# Patient Record
Sex: MALE | Race: BLACK OR AFRICAN AMERICAN | Employment: FULL TIME | URBAN - METROPOLITAN AREA
[De-identification: names, ages, dates, MRNs, and addresses within clinical notes are randomized per-mention and may not be internally consistent; named-entity substitution may affect disease eponyms.]

---

## 2022-11-04 ENCOUNTER — HOSPITAL ENCOUNTER (OUTPATIENT)
Dept: PHYSICAL THERAPY | Age: 27
Discharge: HOME | End: 2022-11-04
Payer: MEDICAID

## 2022-11-04 PROCEDURE — 97162 PT EVAL MOD COMPLEX 30 MIN: CPT

## 2022-11-04 NOTE — PROGRESS NOTES
[address] PHYSICAL THERAPY AT [facility]  [address] [facility] [address] [address],[address], Mauricio chavis, [address] [address]  Phone [phone]  Fax [phone] / [address] [address] PHYSICAL THERAPY SERVICES  Patient Name: Emilie Teague : 1995   Medical   Diagnosis: Pain in left shoulder [M25.512]  Right shoulder pain [M25.511] Treatment Diagnosis: B/L shoulder pain    Onset Date:      Referral Source: Davikarina Laureano MD Start of Care [facility]): 2022   Prior Hospitalization: See medical history Provider #: [id number]   Prior Level of Function: IND, currently a student    Comorbidities: Alcohol use    Medications: Verified on Patient Summary List   The Plan of Care and following information is based on the information from the initial evaluation.   =================================================================================  Assessment / key information:    Subjective functional status/changes: The patient is RHD and presents with c/o B/L shoulder pain that started 7 years ago. He states both shoulders dislocate often L>R. He states dislocations started when he was wrestling, the right shoulder dislocated when he lifted his arm up to pat his dog. No imaging performed. He has used head and ice for pain management. Functional limitations: shoulder fatigue with prolonged sitting, pain with OH activities, lifting tolerance, difficulty and pain with washing hair, apprehension with reaching behind his back, difficulty with donning/doffing jacket and sweatshirts, constant tingling to left hand, occasional off-balance dizziness, pulling, pushing, throwing, difficulty with holding leash for 60 pound dog, pain at worst 10/10        FOTO: 55 /100     Patient goal: return to exercising     Other Objective/Functional Measures:        Fall Risk Assessment: Does the patient have a fear of falling? NO  If yes, what fall risk assessment was performed?       C/S AROM:   Flexion: WNL  Extension: WNL   Side bending: RIGHT 35 deg, LEFT 30 deg   Rotation: WNL      Shoulder AROM:   Flexion: ~130 deg B/L with pain at end range and upper trap compensation on the left   Abduction: ~100 deg B/L with upper trap compensation on the left       Palpation: TTP left proximal biceps, B/L upper trap, B/L rhomboids      Posture: rounded shoulders, FHP       Pain Level (0-10 scale) post treatment: 1-2/10 RIGHT, 3/10 LEFT      ASSESSMENT/Changes in Function:   The patient presents for evaluation to address B/L shoulder pain L>R. He presents with limited B/L shoulder ROM with noted upper trap compensation on the left. Did not perform strength testing secondary to reports of apprehension from patient. Noted tenderness along left proximal biceps, B/L upper trap, B/L rhomboids. Developed and reviewed HEP.  The patient will benefit from skilled PT to address above limitations and improve QoL.   =================================================================================  Eval Complexity: History: MEDIUM  Complexity : 1-2 comorbidities / personal factors will impact the outcome/ POC Exam:MEDIUM Complexity : 3 Standardized tests and measures addressing body structure, function, activity limitation and / or participation in recreation  Presentation: MEDIUM Complexity : Evolving with changing characteristics  Clinical Decision Making:MEDIUM Complexity : FOTO score of 26-74Overall Complexity:MEDIUM  Problem List: pain affecting function, decrease ROM, decrease strength, impaired gait/ balance, decrease ADL/ functional abilitiies, decrease activity tolerance, and decrease flexibility/ joint mobility   Treatment Plan may include any combination of the following: Therapeutic exercise, Neuromuscular reeducation, Manual therapy, Therapeutic activity, Self care/home management, Electric stim unattended , Vasopneumatic device, Gait training, Ultrasound, Mechanical traction, and Electric stim attended  Patient / Family readiness to learn indicated by: asking questions  Persons(s) to be included in education: patient (P)  Barriers to Learning/Limitations: None  Measures taken:    Patient Goal (s): Strengthen shoulders and gain mobility    Patient self reported health status: fair  Rehabilitation Potential: good    Short term goals to be accomplished in 4 visits: The pt will be IND and compliant with HEP and self management of symptoms. Long term goals to be accomplished in 10 visits: The patient will improve B/L shoulder flexion AROM to 150 deg and abduction to 130 deg to improve his OH activity tolerance. The patient will improve B/L C/S side bending AROM 45 deg to improve his ADL tolerance. The patient will report pain at worst to be <6/10 as an indicator of improved QoL. The patient will improve FOTO score to 70/100 as a functional indicator of improved mobility. Frequency / Duration:   Patient to be seen  2  times per week for 10  treatments: (All LTG as above will be assessed and updated every 10 visits or 30 days and progressed as needed)    Patient / Caregiver education and instruction: exercises  Therapist Signature: Devin Rivera PT Date: 93/5/1879   Certification Period: N/A Time: 4:37 PM   ===========================================================================================  I certify that the above Physical Therapy Services are being furnished while the patient is under my care. I agree with the treatment plan and certify that this therapy is necessary. Physician Signature:        Date:       Time:     Please sign and return to [facility] or you may fax the signed copy to [phone]. Thank you.   Adalid Valentin MD

## 2022-11-04 NOTE — PROGRESS NOTES
PT DAILY TREATMENT NOTE     Patient Name: Rad Thompson  Date:2022  : 1995  [x]  Patient  Verified  Payor: Damien Figueredo / Plan: Jag Melendrez / Product Type: Managed Care Medicaid /    In time:11:37  Out time:12:14  Total Treatment Time (min): 37  Visit #: 1 of 10    Medicare/BCBS Only   Total Timed Codes (min):  x 1:1 Treatment Time:  x       Treatment Area: Pain in left shoulder [M25.512]  Right shoulder pain [M25.511]    SUBJECTIVE  Pain Level (0-10 scale): 1-2/10 RIGHT, 3/10 LEFT   Any medication changes, allergies to medications, adverse drug reactions, diagnosis change, or new procedure performed?: [x] No    [] Yes (see summary sheet for update)  Subjective functional status/changes:   [] No changes reported  The patient is RHD and presents with c/o B/L shoulder pain that started 7 years ago. He states both shoulders dislocate often L>R. He states dislocations started when he was wrestling, the right shoulder dislocated when he lifted his arm up to pat his dog. No imaging performed. He has used head and ice for pain management.      Functional limitations: shoulder fatigue with prolonged sitting, pain with OH activities, lifting tolerance, difficulty and pain with washing hair, apprehension with reaching behind his back, difficulty with donning/doffing jacket and sweatshirts, constant tingling to left hand, occasional off-balance dizziness, pulling, pushing, throwing, difficulty with holding leash for 60 pound dog, pain at worst 10/10       FOTO: 55 /100    Patient goal: return to exercising     OBJECTIVE    Modality rationale: decrease edema, decrease inflammation, decrease pain, and increase tissue extensibility to improve the patients ability to perform OH activities    Min Type Additional Details    [] Estim:  []Unatt       []IFC  []Premod                        []Other:  []w/ice   []w/heat  Position:  Location:    [] Estim: []Att    []TENS instruct  []NMES []Other:  []w/US   []w/ice   []w/heat  Position:  Location:    []  Traction: [] Cervical       []Lumbar                       [] Prone          []Supine                       []Intermittent   []Continuous Lbs:  [] before manual  [] after manual    []  Ultrasound: []Continuous   [] Pulsed                           []1MHz   []3MHz W/cm2:  Location:    []  Iontophoresis with dexamethasone         Location: [] Take home patch   [] In clinic    []  Ice     []  heat  []  Ice massage  []  Laser   []  Anodyne Position:  Location:    []  Laser with stim  []  Other:  Position:  Location:    []  Vasopneumatic Device  Pre-treatment girth:   Post-treatment girth:   Measured at landmark location:  Pressure:       [] lo [] med [] hi   Temperature: [] lo [] med [] hi   [] Skin assessment post-treatment:  []intact []redness- no adverse reaction    []redness - adverse reaction:   Vasopnuematic compression justification:  Per bilateral girth measures taken and listed above the edema is considered significant and having an impact on the patient's strength, balance, and gait    37 min [x]Eval                  []Re-Eval       x min Therapeutic Exercise:     Rationale: increase ROM, increase strength, improve coordination, improve balance, and increase proprioception to improve the patients ability to progress to functional activities limited by pain or other dysfunction     x min Therapeutic Activity:     Rationale: to improve functional activities, model real life movements and performance specific to the patients need with supervision to return the patient to their PLOF      x min Neuromuscular Re-education:     Rationale: exercises designed to improve and/or maintain balance, coordination, kinesthetic sense, posture, and/or proprioception for functional activities to improve the patients ability to function in daily life    x min Gait Training:  x feet with x  over level surfaces with xA.  Cuing for x.   x feet x of following-  [] March            [] Lateral                   [] Horizontal HT  [] Tandem         [] Banded Lateral     [] Vertical HT  [] Retrograde    [] Banded Monster   [] Dual Task  [] Hurdles          [] Celena Pont                    [] Ladder Drills  [] Heel Walk      [] Toe Walk   Rationale: improve safety and dynamic movement with household/community ambulation. x min Manual Therapy:  NC per insurance restrictions    Rationale: decrease pain, increase ROM, increase tissue extensibility, decrease trigger points, and increase postural awareness to improve his tolerance for OH activities   The manual therapy interventions were performed at a separate and distinct time from the therapeutic activities interventions          X throughout treatment min Patient Education: [x] Review HEP          Other Objective/Functional Measures:    Fall Risk Assessment: Does the patient have a fear of falling? NO  If yes, what fall risk assessment was performed? C/S AROM:   Flexion: WNL  Extension: WNL   Side bending: RIGHT 35 deg, LEFT 30 deg   Rotation: WNL     Shoulder AROM:   Flexion: ~130 deg B/L with pain at end range and upper trap compensation on the left   Abduction: ~100 deg B/L with upper trap compensation on the left      Palpation: TTP left proximal biceps, B/L upper trap, B/L rhomboids     Posture: rounded shoulders, FHP      Pain Level (0-10 scale) post treatment: 1-2/10 RIGHT, 3/10 LEFT     ASSESSMENT/Changes in Function:   The patient presents for evaluation to address B/L shoulder pain L>R. He presents with limited B/L shoulder ROM with noted upper trap compensation on the left. Did not perform strength testing secondary to reports of apprehension from patient. Noted tenderness along left proximal biceps, B/L upper trap, B/L rhomboids. Developed and reviewed HEP. The patient will benefit from skilled PT to address above limitations and improve QoL.      Patient will continue to benefit from skilled PT services to modify and progress therapeutic interventions, address functional mobility deficits, address ROM deficits, address strength deficits, analyze and address soft tissue restrictions, analyze and cue movement patterns, analyze and modify body mechanics/ergonomics, assess and modify postural abnormalities, address imbalance/dizziness, and instruct in home and community integration to attain remaining goals. [x]  See Plan of Care  []  See progress note/recertification  []  See Discharge Summary         Progress towards goals / Updated goals:  Short term goals to be accomplished in 4 visits: The pt will be IND and compliant with HEP and self management of symptoms. Long term goals to be accomplished in 10 visits: The patient will improve B/L shoulder flexion AROM to 150 deg and abduction to 130 deg to improve his OH activity tolerance. The patient will improve B/L C/S side bending AROM 45 deg to improve his ADL tolerance. The patient will report pain at worst to be <6/10 as an indicator of improved QoL. The patient will improve FOTO score to 70/100 as a functional indicator of improved mobility.       PLAN  []  Upgrade activities as tolerated     []  Continue plan of care  []  Update interventions per flow sheet       []  Discharge due to:_  [x]  Other: The patient will benefit from skilled PT 2x/week for 10 visits       Justification for Eval Code Complexity:  Patient History : hx of multiple dislocations to B/L shoulders   Examination see exam   Clinical Presentation: evolving with changing characteristics   Clinical Decision Making : FOTO : 54 /100     Yoni Norman, PT 11/4/2022  11:38 AM    Future Appointments   Date Time Provider Justen Harris   11/8/2022 11:30 AM Haroldo Vyas PTA [facility]   11/10/2022  2:00 PM Zain Garza [facility]   11/15/2022 11:30 AM Haroldo Vyas PTA [facility]   11/18/2022  1:15 PM Eber Sommer, ELODIA [facility]   11/22/2022 12:15 PM Eber Sommer PT [facility] 11/29/2022 12:15 PM Jl Jin PT [facility]   12/1/2022 12:15 PM Chon Gutierrez PTA [facility]   12/6/2022 11:30 AM Chon Gutierrez PTA [facility]   12/8/2022 12:15 PM Chon Gutierrez PTA [facility]

## 2022-11-08 ENCOUNTER — HOSPITAL ENCOUNTER (OUTPATIENT)
Dept: PHYSICAL THERAPY | Age: 27
Discharge: HOME | End: 2022-11-08
Payer: MEDICAID

## 2022-11-08 PROCEDURE — 97530 THERAPEUTIC ACTIVITIES: CPT

## 2022-11-08 PROCEDURE — 97112 NEUROMUSCULAR REEDUCATION: CPT

## 2022-11-08 PROCEDURE — 97110 THERAPEUTIC EXERCISES: CPT

## 2022-11-08 NOTE — PROGRESS NOTES
PT DAILY TREATMENT NOTE     Patient Name: Toya Thapa  Date:2022  : 1995  [x]  Patient  Verified  Payor: Abel Stearnseste / Plan: [employer] / Product Type: Managed Care Medicaid /    In time:   Out time: 7225  Total Treatment Time (min): 62  Visit #: 2 of 10    Medicare/BCBS Only   Total Timed Codes (min):  x 1:1 Treatment Time:  x       Treatment Area: Pain in left shoulder [M25.512]  Right shoulder pain [M25.511]    SUBJECTIVE  Pain Level (0-10 scale): 2/10 RIGHT, 1/10 LEFT   Any medication changes, allergies to medications, adverse drug reactions, diagnosis change, or new procedure performed?: [x] No    [] Yes (see summary sheet for update)  Subjective functional status/changes:   [] No changes reported  Patient notes he wasn't too sore following last session, states he has been performing HEP.          OBJECTIVE    Modality rationale: decrease edema, decrease inflammation, decrease pain, and increase tissue extensibility to improve the patients ability to perform OH activities    Min Type Additional Details    [] Estim:  []Unatt       []IFC  []Premod                        []Other:  []w/ice   []w/heat  Position:  Location:    [] Estim: []Att    []TENS instruct  []NMES                    []Other:  []w/US   []w/ice   []w/heat  Position:  Location:    []  Traction: [] Cervical       []Lumbar                       [] Prone          []Supine                       []Intermittent   []Continuous Lbs:  [] before manual  [] after manual    []  Ultrasound: []Continuous   [] Pulsed                           []1MHz   []3MHz W/cm2:  Location:    []  Iontophoresis with dexamethasone         Location: [] Take home patch   [] In clinic   10' [x]  Ice     []  heat  []  Ice massage  []  Laser   []  Anodyne Position: seated  Location: B/L shoulders    []  Laser with stim  []  Other:  Position:  Location:    []  Vasopneumatic Device  Pre-treatment girth:   Post-treatment girth:   Measured at landmark location:  Pressure:       [] lo [] med [] hi   Temperature: [] lo [] med [] hi   [] Skin assessment post-treatment:  []intact []redness- no adverse reaction    []redness - adverse reaction:   Vasopnuematic compression justification:  Per bilateral girth measures taken and listed above the edema is considered significant and having an impact on the patient's strength, balance, and gait        17 min Therapeutic Exercise:    UBE fwd  UT/LS stretch  Shoulder isometric 6 ways   Rationale: increase ROM, increase strength, improve coordination, improve balance, and increase proprioception to improve the patients ability to progress to functional activities limited by pain or other dysfunction     15 min Therapeutic Activity:    TB rows/ext  Full cans   Wall V's   Rationale: to improve functional activities, model real life movements and performance specific to the patients need with supervision to return the patient to their PLOF      15 min Neuromuscular Re-education:    Wall clocks   Prone letters   Rationale: exercises designed to improve and/or maintain balance, coordination, kinesthetic sense, posture, and/or proprioception for functional activities to improve the patients ability to function in daily life    x min Gait Training:  x feet with x  over level surfaces with xA. Cuing for x.   x feet x of following-  [] March            [] Lateral                   [] Horizontal HT  [] Tandem         [] Banded Lateral     [] Vertical HT  [] Retrograde    [] Banded Monster   [] Dual Task  [] Hurdles          [] Darcia Oracio                    [] Ladder Drills  [] Heel Walk      [] Toe Walk   Rationale: improve safety and dynamic movement with household/community ambulation.     x min Manual Therapy:  NC per insurance restrictions    Rationale: decrease pain, increase ROM, increase tissue extensibility, decrease trigger points, and increase postural awareness to improve his tolerance for OH activities   The manual therapy interventions were performed at a separate and distinct time from the therapeutic activities interventions          X throughout treatment min Patient Education: [x] Review HEP          Other Objective/Functional Measures:    Verbal cuing to avoid maximum isometric contraction  Reports compliance with HEP (GOAL CHECK)  Introduction of stretching and strengthening exercises    Pain Level (0-10 scale) post treatment: 1/10 RIGHT, 0/10 LEFT     ASSESSMENT/Changes in Function:   First visit following IE, patient did well with addition of stretching and strengthening exercises. Progress program gradually due to increased tenderness in the anterior shoulders. Verbal cuing to avoid leaning into a stretch with wall V's. Notes fatigue following rows/ext and prone letters in the scapular area. Verbal cuing to avoid maximum contraction with isometric exercises. No complaints of increased pain at end of session. Continue to progress as tolerated. Patient will continue to benefit from skilled PT services to modify and progress therapeutic interventions, address functional mobility deficits, address ROM deficits, address strength deficits, analyze and address soft tissue restrictions, analyze and cue movement patterns, analyze and modify body mechanics/ergonomics, assess and modify postural abnormalities, address imbalance/dizziness, and instruct in home and community integration to attain remaining goals. [x]  See Plan of Care  []  See progress note/recertification  []  See Discharge Summary         Progress towards goals / Updated goals:  Short term goals to be accomplished in 4 visits: The pt will be IND and compliant with HEP and self management of symptoms. Reports compliance (11/8/2022)    Long term goals to be accomplished in 10 visits: The patient will improve B/L shoulder flexion AROM to 150 deg and abduction to 130 deg to improve his OH activity tolerance.    The patient will improve B/L C/S side bending AROM 45 deg to improve his ADL tolerance. The patient will report pain at worst to be <6/10 as an indicator of improved QoL. The patient will improve FOTO score to 70/100 as a functional indicator of improved mobility.       PLAN  [x]  Upgrade activities as tolerated     [x]  Continue plan of care  []  Update interventions per flow sheet       []  Discharge due to:_  []  Other:          [place] Bearchen, [facility] 11/8/2022  11:38 AM    Future Appointments   Date Time Provider Justen Harris   11/10/2022  2:00 PM Akin Tian Councilman [facility]   11/15/2022 11:30 AM Dakota Avina PTA [facility]   11/18/2022  1:15 PM Peggy Kovacs, PT [facility]   11/22/2022 12:15 PM Peggy Kovacs, PT [facility]   11/29/2022 12:15 PM Peggy Kovacs, PT [facility]   12/1/2022 12:15 PM Dakota Avina PTA [facility]   12/6/2022 11:30 AM Dakota Avina PTA [facility]   12/8/2022 12:15 PM Dakota Avina PTA [facility]

## 2022-11-10 ENCOUNTER — HOSPITAL ENCOUNTER (OUTPATIENT)
Dept: PHYSICAL THERAPY | Age: 27
Discharge: HOME | End: 2022-11-10
Payer: MEDICAID

## 2022-11-10 PROCEDURE — 97110 THERAPEUTIC EXERCISES: CPT

## 2022-11-10 PROCEDURE — 97112 NEUROMUSCULAR REEDUCATION: CPT

## 2022-11-10 PROCEDURE — 97530 THERAPEUTIC ACTIVITIES: CPT

## 2022-11-10 NOTE — PROGRESS NOTES
PT DAILY TREATMENT NOTE     Patient Name: Grecia Handing  Date:11/10/2022  : 1995  [x]  Patient  Verified  Payor: Jomar Marsh / Plan: Maureen Rangel / Product Type: Managed Care Medicaid /    In time: 158  Out time: 253  Total Treatment Time (min): 55  Visit #: 3 of 10    Medicare/BCBS Only   Total Timed Codes (min):  x 1:1 Treatment Time:  x       Treatment Area: Pain in left shoulder [M25.512]  Right shoulder pain [M25.511]    SUBJECTIVE  Pain Level (0-10 scale): 4/10 RIGHT, 3/10 LEFT   Any medication changes, allergies to medications, adverse drug reactions, diagnosis change, or new procedure performed?: [x] No    [] Yes (see summary sheet for update)  Subjective functional status/changes:   [] No changes reported  Patient notes he was sore following last session and is having increased pain today due to sleeping on his shoulder in the wrong position last night.        OBJECTIVE    Modality rationale: decrease edema, decrease inflammation, decrease pain, and increase tissue extensibility to improve the patients ability to perform OH activities    Min Type Additional Details    [] Estim:  []Unatt       []IFC  []Premod                        []Other:  []w/ice   []w/heat  Position:  Location:    [] Estim: []Att    []TENS instruct  []NMES                    []Other:  []w/US   []w/ice   []w/heat  Position:  Location:    []  Traction: [] Cervical       []Lumbar                       [] Prone          []Supine                       []Intermittent   []Continuous Lbs:  [] before manual  [] after manual    []  Ultrasound: []Continuous   [] Pulsed                           []1MHz   []3MHz W/cm2:  Location:    []  Iontophoresis with dexamethasone         Location: [] Take home patch   [] In clinic   10' [x]  Ice     []  heat  []  Ice massage  []  Laser   []  Anodyne Position: seated  Location: B/L shoulders    []  Laser with stim  []  Other:  Position:  Location:    []  Vasopneumatic Device  Pre-treatment girth:   Post-treatment girth:   Measured at landmark location:  Pressure:       [] lo [] med [] hi   Temperature: [] lo [] med [] hi   [] Skin assessment post-treatment:  []intact []redness- no adverse reaction    []redness - adverse reaction:   Vasopnuematic compression justification:  Per bilateral girth measures taken and listed above the edema is considered significant and having an impact on the patient's strength, balance, and gait        15 min Therapeutic Exercise:    UBE fwd  UT/LS stretch  Shoulder isometric 6 ways   Rationale: increase ROM, increase strength, improve coordination, improve balance, and increase proprioception to improve the patients ability to progress to functional activities limited by pain or other dysfunction     15 min Therapeutic Activity:    TB rows/ext  Full cans   Wall V's   Rationale: to improve functional activities, model real life movements and performance specific to the patients need with supervision to return the patient to their PLOF      15 min Neuromuscular Re-education:    Wall clocks   Scap stabs with YTB  Prone letters   Rationale: exercises designed to improve and/or maintain balance, coordination, kinesthetic sense, posture, and/or proprioception for functional activities to improve the patients ability to function in daily life    x min Gait Training:  x feet with x  over level surfaces with xA. Cuing for x.   x feet x of following-  [] March            [] Lateral                   [] Horizontal HT  [] Tandem         [] Banded Lateral     [] Vertical HT  [] Retrograde    [] Banded Monster   [] Dual Task  [] Hurdles          [] Joretta Big                    [] Ladder Drills  [] Heel Walk      [] Toe Walk   Rationale: improve safety and dynamic movement with household/community ambulation.     x min Manual Therapy:  NC per insurance restrictions    Rationale: decrease pain, increase ROM, increase tissue extensibility, decrease trigger points, and increase postural awareness to improve his tolerance for OH activities   The manual therapy interventions were performed at a separate and distinct time from the therapeutic activities interventions          X throughout treatment min Patient Education: [x] Review HEP          Other Objective/Functional Measures:    Reports pain at worst is a 8/10 (GOAL CHECK)  Introduced scap stabs with YTB  Increased reps with rows/ext and shoulder isometrics  Tactile cuing for prone letters    Pain Level (0-10 scale) post treatment: 5/10 RIGHT, 4/10 LEFT     ASSESSMENT/Changes in Function:   Patient tolerated addition of exercise with no complaints of increase in pain levels. States he does feel fatigued in the anterior shoulders and scapular area following session. Verbal cuing for scapular retraction with exercise. Notes high level of fatigue in scapular area following wall clocks. Continue to progress patient as tolerated. Patient will continue to benefit from skilled PT services to modify and progress therapeutic interventions, address functional mobility deficits, address ROM deficits, address strength deficits, analyze and address soft tissue restrictions, analyze and cue movement patterns, analyze and modify body mechanics/ergonomics, assess and modify postural abnormalities, address imbalance/dizziness, and instruct in home and community integration to attain remaining goals. [x]  See Plan of Care  []  See progress note/recertification  []  See Discharge Summary         Progress towards goals / Updated goals:  Short term goals to be accomplished in 4 visits: The pt will be IND and compliant with HEP and self management of symptoms. Reports compliance (11/8/2022)    Long term goals to be accomplished in 10 visits: The patient will improve B/L shoulder flexion AROM to 150 deg and abduction to 130 deg to improve his OH activity tolerance.    The patient will improve B/L C/S side bending AROM 45 deg to improve his ADL tolerance. The patient will report pain at worst to be <6/10 as an indicator of improved QoL. Pain at worst increases to 8/10 (11/10/2022)  The patient will improve FOTO score to 70/100 as a functional indicator of improved mobility.       PLAN  [x]  Upgrade activities as tolerated     [x]  Continue plan of care  []  Update interventions per flow sheet       []  Discharge due to:_  []  Other:          Dorothy Modi PTA 11/10/2022  11:38 AM    Future Appointments   Date Time Provider Justen Harris   11/10/2022  2:00 PM Josette Moraes [facility]   11/15/2022 11:30 AM Car Rosales PTA [facility]   11/18/2022  1:15 PM Tonia Alcala, PT [facility]   11/22/2022 12:15 PM Tonia Alcala, PT [facility]   11/29/2022 12:15 PM Tonia Alcala, PT [facility]   12/1/2022 12:15 PM Car Rosales PTA [facility]   12/6/2022 11:30 AM Tonia Alcala, PT [facility]   12/8/2022 12:15 PM Car Rosales PTA [facility]

## 2022-11-15 ENCOUNTER — HOSPITAL ENCOUNTER (OUTPATIENT)
Dept: PHYSICAL THERAPY | Age: 27
Discharge: HOME | End: 2022-11-15
Payer: MEDICAID

## 2022-11-15 PROCEDURE — 97530 THERAPEUTIC ACTIVITIES: CPT

## 2022-11-15 PROCEDURE — 97112 NEUROMUSCULAR REEDUCATION: CPT

## 2022-11-15 PROCEDURE — 97110 THERAPEUTIC EXERCISES: CPT

## 2022-11-15 NOTE — PROGRESS NOTES
PT DAILY TREATMENT NOTE     Patient Name: Nabil Russell  Date:11/15/2022  : 1995  [x]  Patient  Verified  Payor: Fransisco Arevalo / Plan: Walter Abraham / Product Type: Managed Care Medicaid /    In time: 538  Out time: 1250  Total Treatment Time (min): 70  Visit #: 4 of 10    Medicare/BCBS Only   Total Timed Codes (min):  x 1:1 Treatment Time:  x       Treatment Area: Pain in left shoulder [M25.512]  Right shoulder pain [M25.511]    SUBJECTIVE  Pain Level (0-10 scale): 1/10 RIGHT, 2/10 LEFT   Any medication changes, allergies to medications, adverse drug reactions, diagnosis change, or new procedure performed?: [x] No    [] Yes (see summary sheet for update)  Subjective functional status/changes:   [] No changes reported  Patient reports he wasn't in any pain following last session, notes he did feel tight.        OBJECTIVE    Modality rationale: decrease edema, decrease inflammation, decrease pain, and increase tissue extensibility to improve the patients ability to perform OH activities    Min Type Additional Details    [] Estim:  []Unatt       []IFC  []Premod                        []Other:  []w/ice   []w/heat  Position:  Location:    [] Estim: []Att    []TENS instruct  []NMES                    []Other:  []w/US   []w/ice   []w/heat  Position:  Location:    []  Traction: [] Cervical       []Lumbar                       [] Prone          []Supine                       []Intermittent   []Continuous Lbs:  [] before manual  [] after manual    []  Ultrasound: []Continuous   [] Pulsed                           []1MHz   []3MHz W/cm2:  Location:    []  Iontophoresis with dexamethasone         Location: [] Take home patch   [] In clinic   10' [x]  Ice     []  heat  []  Ice massage  []  Laser   []  Anodyne Position: seated  Location: B/L shoulders    []  Laser with stim  []  Other:  Position:  Location:    []  Vasopneumatic Device  Pre-treatment girth:   Post-treatment girth:   Measured at landmark location:  Pressure:       [] lo [] med [] hi   Temperature: [] lo [] med [] hi   [] Skin assessment post-treatment:  []intact []redness- no adverse reaction    []redness - adverse reaction:   Vasopnuematic compression justification:  Per bilateral girth measures taken and listed above the edema is considered significant and having an impact on the patient's strength, balance, and gait        20 min Therapeutic Exercise:    UBE fwd  UT/LS stretch  Shoulder isometric 6 ways   Rationale: increase ROM, increase strength, improve coordination, improve balance, and increase proprioception to improve the patients ability to progress to functional activities limited by pain or other dysfunction     20 min Therapeutic Activity:    TB rows/ext  Supine serratus punches  Wall push ups   Full cans   Wall V's   Rationale: to improve functional activities, model real life movements and performance specific to the patients need with supervision to return the patient to their PLOF      20 min Neuromuscular Re-education:    Wall clocks   Scap stabs with YTB  Ball on the wall   Prone letters   Rationale: exercises designed to improve and/or maintain balance, coordination, kinesthetic sense, posture, and/or proprioception for functional activities to improve the patients ability to function in daily life    x min Gait Training:  x feet with x  over level surfaces with xA. Cuing for x.   x feet x of following-  [] March            [] Lateral                   [] Horizontal HT  [] Tandem         [] Banded Lateral     [] Vertical HT  [] Retrograde    [] Banded Monster   [] Dual Task  [] Hurdles          [] Jeferson Hodan                    [] Ladder Drills  [] Heel Walk      [] Toe Walk   Rationale: improve safety and dynamic movement with household/community ambulation.     x min Manual Therapy:  NC per insurance restrictions    Rationale: decrease pain, increase ROM, increase tissue extensibility, decrease trigger points, and increase postural awareness to improve his tolerance for OH activities   The manual therapy interventions were performed at a separate and distinct time from the therapeutic activities interventions          X throughout treatment min Patient Education: [x] Review HEP          Other Objective/Functional Measures:    Introduced wall push ups, supine serratus punches and ball on wall   Increased reps with wall push ups, scap stabs, prone letters    Pain Level (0-10 scale) post treatment: 1/10 RIGHT, 0/10 LEFT     ASSESSMENT/Changes in Function:   Patient did well with addition of wall push ups, ball on the wall and increased reps with exercises. Notes fatigue in shoulder with ball on the wall but no increase in pain. States he feels his shoulders are getting stronger with therapy and performing his HEP. Continue to progress as tolerated. Patient will continue to benefit from skilled PT services to modify and progress therapeutic interventions, address functional mobility deficits, address ROM deficits, address strength deficits, analyze and address soft tissue restrictions, analyze and cue movement patterns, analyze and modify body mechanics/ergonomics, assess and modify postural abnormalities, address imbalance/dizziness, and instruct in home and community integration to attain remaining goals. [x]  See Plan of Care  []  See progress note/recertification  []  See Discharge Summary         Progress towards goals / Updated goals:  Short term goals to be accomplished in 4 visits: The pt will be IND and compliant with HEP and self management of symptoms. Reports compliance (11/8/2022)    Long term goals to be accomplished in 10 visits: The patient will improve B/L shoulder flexion AROM to 150 deg and abduction to 130 deg to improve his OH activity tolerance. The patient will improve B/L C/S side bending AROM 45 deg to improve his ADL tolerance.    The patient will report pain at worst to be <6/10 as an indicator of improved QoL. Pain at worst increases to 8/10 (11/10/2022)  The patient will improve FOTO score to 70/100 as a functional indicator of improved mobility.       PLAN  [x]  Upgrade activities as tolerated     [x]  Continue plan of care  []  Update interventions per flow sheet       []  Discharge due to:_  []  Other:          Lili Snyder, [facility] 11/15/2022  11:38 AM    Future Appointments   Date Time Provider Justen Harris   11/18/2022  1:15 PM Eber Sommer, PT [facility]   11/22/2022 12:15 PM Eber Sommer, PT [facility]   11/29/2022 12:15 PM Eber Sommer, PT [facility]   12/1/2022 12:15 PM Harlodo Vyas PTA [facility]   12/6/2022 11:30 AM Eber Sommer PT [facility]   12/8/2022 12:15 PM Haroldo Vyas PTA [facility]

## 2022-11-18 ENCOUNTER — HOSPITAL ENCOUNTER (OUTPATIENT)
Dept: PHYSICAL THERAPY | Age: 27
Discharge: HOME | End: 2022-11-18
Payer: MEDICAID

## 2022-11-18 PROCEDURE — 97110 THERAPEUTIC EXERCISES: CPT

## 2022-11-18 PROCEDURE — 97112 NEUROMUSCULAR REEDUCATION: CPT

## 2022-11-18 PROCEDURE — 97530 THERAPEUTIC ACTIVITIES: CPT

## 2022-11-18 NOTE — PROGRESS NOTES
PT DAILY TREATMENT NOTE     Patient Name: Carl Ochoa  Date:2022  : 1995  [x]  Patient  Verified  Payor: Elina Myers / Plan: Mey Fabian / Product Type: Managed Care Medicaid /    In time: 1:16  Out time: 2:07   Total Treatment Time (min): 51   Visit #: 5 of 10    Medicare/BCBS Only   Total Timed Codes (min):  x 1:1 Treatment Time:  x       Treatment Area: Pain in left shoulder [M25.512]  Right shoulder pain [M25.511]    SUBJECTIVE  Pain Level (0-10 scale): 2/10 B/L   Any medication changes, allergies to medications, adverse drug reactions, diagnosis change, or new procedure performed?: [x] No    [] Yes (see summary sheet for update)  Subjective functional status/changes:   [] No changes reported  The patient reports his pain is low today but feels more discomfort.        OBJECTIVE    Modality rationale: decrease edema, decrease inflammation, decrease pain, and increase tissue extensibility to improve the patients ability to perform OH activities    Min Type Additional Details    [] Estim:  []Unatt       []IFC  []Premod                        []Other:  []w/ice   []w/heat  Position:  Location:    [] Estim: []Att    []TENS instruct  []NMES                    []Other:  []w/US   []w/ice   []w/heat  Position:  Location:    []  Traction: [] Cervical       []Lumbar                       [] Prone          []Supine                       []Intermittent   []Continuous Lbs:  [] before manual  [] after manual    []  Ultrasound: []Continuous   [] Pulsed                           []1MHz   []3MHz W/cm2:  Location:    []  Iontophoresis with dexamethasone         Location: [] Take home patch   [] In clinic   PD [x]  Ice     []  heat  []  Ice massage  []  Laser   []  Anodyne Position: seated  Location: B/L shoulders    []  Laser with stim  []  Other:  Position:  Location:    []  Vasopneumatic Device  Pre-treatment girth:   Post-treatment girth:   Measured at landmark location:  Pressure: [] lo [] med [] hi   Temperature: [] lo [] med [] hi   [] Skin assessment post-treatment:  []intact []redness- no adverse reaction    []redness - adverse reaction:   Vasopnuematic compression justification:  Per bilateral girth measures taken and listed above the edema is considered significant and having an impact on the patient's strength, balance, and gait        21  min Therapeutic Exercise:    HEP DEVELOPMENT AND REVIEW   UBE fwd  Plank at [facility] push ups  Supine chest press - 5# bar     Ball on the wall -TC    D/C TO HEP:   UT/LS stretch  Shoulder isometric 6 ways   Rationale: increase ROM, increase strength, improve coordination, improve balance, and increase proprioception to improve the patients ability to progress to functional activities limited by pain or other dysfunction     15  min Therapeutic Activity:    TB rows/ext   Full cans x3  Wall V's with YTB   Rationale: to improve functional activities, model real life movements and performance specific to the patients need with supervision to return the patient to their PLOF      15  min Neuromuscular Re-education:    Wall clocks   Scap stabs with YTB  Prone letters: M, T, W   Rationale: exercises designed to improve and/or maintain balance, coordination, kinesthetic sense, posture, and/or proprioception for functional activities to improve the patients ability to function in daily life    x min Gait Training:  x feet with x  over level surfaces with xA. Cuing for x.   x feet x of following-  [] March            [] Lateral                   [] Horizontal HT  [] Tandem         [] Banded Lateral     [] Vertical HT  [] Retrograde    [] Banded Monster   [] Dual Task  [] Hurdles          [] [place]                    [] Ladder Drills  [] Heel Walk      [] Toe Walk   Rationale: improve safety and dynamic movement with household/community ambulation.     x min Manual Therapy:  NC per insurance restrictions    Rationale: decrease pain, increase ROM, increase tissue extensibility, decrease trigger points, and increase postural awareness to improve his tolerance for OH activities   The manual therapy interventions were performed at a separate and distinct time from the therapeutic activities interventions          X throughout treatment min Patient Education: [x] Review HEP          Other Objective/Functional Measures:    Updated HEP     Pain Level (0-10 scale) post treatment: 1/10 B/L      ASSESSMENT/Changes in Function:   Updated HEP today for continued symptom management IND. Tactile cuing during prone letters for improved scapular depression. Introduced planks at [employer] and supine chest press for lifting stability. Noted moderate UT compensation on the right during wall V, corrected with tactile and verbal cuing. Continue to progress as tolerated nv. Patient will continue to benefit from skilled PT services to modify and progress therapeutic interventions, address functional mobility deficits, address ROM deficits, address strength deficits, analyze and address soft tissue restrictions, analyze and cue movement patterns, analyze and modify body mechanics/ergonomics, assess and modify postural abnormalities, address imbalance/dizziness, and instruct in home and community integration to attain remaining goals. [x]  See Plan of Care  []  See progress note/recertification  []  See Discharge Summary         Progress towards goals / Updated goals:  Short term goals to be accomplished in 4 visits: The pt will be IND and compliant with HEP and self management of symptoms. Reports compliance (11/8/2022), updated today 11/18/2022    Long term goals to be accomplished in 10 visits: The patient will improve B/L shoulder flexion AROM to 150 deg and abduction to 130 deg to improve his OH activity tolerance. The patient will improve B/L C/S side bending AROM 45 deg to improve his ADL tolerance.    The patient will report pain at worst to be <6/10 as an indicator of improved QoL. Pain at worst increases to 8/10 (11/10/2022)  The patient will improve FOTO score to 70/100 as a functional indicator of improved mobility.       PLAN  [x]  Upgrade activities as tolerated     [x]  Continue plan of care  []  Update interventions per flow sheet       []  Discharge due to:_  [x]  Other:  progress as tolerated     Cheikh Renteria, PT 11/18/2022  11:38 AM    Future Appointments   Date Time Provider Justen Harris   11/22/2022 12:15 PM Prabha Rubalcava, PT [facility]   11/29/2022 12:15 PM Prabha Rubalcava PT [facility]   12/1/2022 12:15 PM Kate Ruvalcaba PTA [facility]   12/6/2022 11:30 AM Prabha Rubalcava PT [facility]   12/8/2022 12:15 PM Kate Ruvalcaba PTA [facility]

## 2022-11-22 ENCOUNTER — HOSPITAL ENCOUNTER (OUTPATIENT)
Dept: PHYSICAL THERAPY | Age: 27
Discharge: HOME | End: 2022-11-22
Payer: MEDICAID

## 2022-11-22 PROCEDURE — 97530 THERAPEUTIC ACTIVITIES: CPT

## 2022-11-22 PROCEDURE — 97110 THERAPEUTIC EXERCISES: CPT

## 2022-11-22 PROCEDURE — 97112 NEUROMUSCULAR REEDUCATION: CPT

## 2022-11-22 NOTE — PROGRESS NOTES
PT DAILY TREATMENT NOTE     Patient Name: Nusrat Heaton  Date:2022  : 1995  [x]  Patient  Verified  Payor: Othelia Galeazzi / Plan: [id number] / Product Type: Managed Care Medicaid /    In time: 12:20  Out time: 1:02    Total Treatment Time (min): 42    Visit #: 6 of 10    Medicare/BCBS Only   Total Timed Codes (min):  x 1:1 Treatment Time:  x       Treatment Area: Pain in left shoulder [M25.512]  Right shoulder pain [M25.511]    SUBJECTIVE  Pain Level (0-10 scale): 1/10 B/L   Any medication changes, allergies to medications, adverse drug reactions, diagnosis change, or new procedure performed?: [x] No    [] Yes (see summary sheet for update)  Subjective functional status/changes:   [] No changes reported  The patient reports some soreness in his upper back today, but felt better after doing some heat.        OBJECTIVE    Modality rationale: decrease edema, decrease inflammation, decrease pain, and increase tissue extensibility to improve the patients ability to perform OH activities    Min Type Additional Details    [] Estim:  []Unatt       []IFC  []Premod                        []Other:  []w/ice   []w/heat  Position:  Location:    [] Estim: []Att    []TENS instruct  []NMES                    []Other:  []w/US   []w/ice   []w/heat  Position:  Location:    []  Traction: [] Cervical       []Lumbar                       [] Prone          []Supine                       []Intermittent   []Continuous Lbs:  [] before manual  [] after manual    []  Ultrasound: []Continuous   [] Pulsed                           []1MHz   []3MHz W/cm2:  Location:    []  Iontophoresis with dexamethasone         Location: [] Take home patch   [] In clinic   PD [x]  Ice     []  heat  []  Ice massage  []  Laser   []  Anodyne Position: seated  Location: B/L shoulders    []  Laser with stim  []  Other:  Position:  Location:    []  Vasopneumatic Device  Pre-treatment girth:   Post-treatment girth:   Measured at landmark location:  Pressure:       [] lo [] med [] hi   Temperature: [] lo [] med [] hi   [] Skin assessment post-treatment:  []intact []redness- no adverse reaction    []redness - adverse reaction:   Vasopnuematic compression justification:  Per bilateral girth measures taken and listed above the edema is considered significant and having an impact on the patient's strength, balance, and gait        16   min Therapeutic Exercise:    UBE fwd  Plank at [place] push ups  Supine chest press - 5# bar   TB ER walkout   TB IR    Rationale: increase ROM, increase strength, improve coordination, improve balance, and increase proprioception to improve the patients ability to progress to functional activities limited by pain or other dysfunction     8   min Therapeutic Activity:    TB rows/ext   Full cans x 3-TC  Wall V's with YTB   Rationale: to improve functional activities, model real life movements and performance specific to the patients need with supervision to return the patient to their PLOF      18   min Neuromuscular Re-education:    Wall clocks   Scap stabs with YTB 1-4  1/2 prone letters: M, T, W   Rationale: exercises designed to improve and/or maintain balance, coordination, kinesthetic sense, posture, and/or proprioception for functional activities to improve the patients ability to function in daily life    x min Gait Training:  x feet with x  over level surfaces with xA. Cuing for x.   x feet x of following-  [] March            [] Lateral                   [] Horizontal HT  [] Tandem         [] Banded Lateral     [] Vertical HT  [] Retrograde    [] Banded Monster   [] Dual Task  [] Hurdles          [] Natalie Clunes                    [] Ladder Drills  [] Heel Walk      [] Toe Walk   Rationale: improve safety and dynamic movement with household/community ambulation.     x min Manual Therapy:  NC per insurance restrictions    Rationale: decrease pain, increase ROM, increase tissue extensibility, decrease trigger points, and increase postural awareness to improve his tolerance for OH activities   The manual therapy interventions were performed at a separate and distinct time from the therapeutic activities interventions          X throughout treatment min Patient Education: [x] Review HEP          Other Objective/Functional Measures:    Right shoulder AROM: flexion 165 deg, abduction 140 deg   Left shoulder AROM: flexion 150 deg, abduction 135 deg   Increased resistance for TB rows/extensions   Introduced TB ER walk outs, and TB IR     Pain Level (0-10 scale) post treatment: 1/10 B/L       ASSESSMENT/Changes in Function:   The patient presents with improved B/L shoulder flexion and ABD AROM compared to IE, indicating improved tolerance to [facility] activities. Good tolerance to TB ER walkouts, reported increased muscle fatigue without pain following the exercise. Performed letters in 1/2 prone position for improved WB tolerance through UE's. Progress as tolerated nv. Patient will continue to benefit from skilled PT services to modify and progress therapeutic interventions, address functional mobility deficits, address ROM deficits, address strength deficits, analyze and address soft tissue restrictions, analyze and cue movement patterns, analyze and modify body mechanics/ergonomics, assess and modify postural abnormalities, address imbalance/dizziness, and instruct in home and community integration to attain remaining goals. [x]  See Plan of Care  []  See progress note/recertification  []  See Discharge Summary         Progress towards goals / Updated goals:  Short term goals to be accomplished in 4 visits: The pt will be IND and compliant with HEP and self management of symptoms. Reports compliance (11/8/2022), updated today 11/18/2022    Long term goals to be accomplished in 10 visits:    The patient will improve B/L shoulder flexion AROM to 150 deg and abduction to 130 deg to improve his OH activity tolerance. Current: MET Right shoulder AROM: flexion 165 deg, abduction 140 deg without UT compensation and Left shoulder AROM: flexion 150 deg, abduction 135 deg without UT compensation 11/22/2022  The patient will improve B/L C/S side bending AROM 45 deg to improve his ADL tolerance. The patient will report pain at worst to be <6/10 as an indicator of improved QoL. Pain at worst increases to 8/10 (11/10/2022)  The patient will improve FOTO score to 70/100 as a functional indicator of improved mobility.       PLAN  [x]  Upgrade activities as tolerated     [x]  Continue plan of care  []  Update interventions per flow sheet       []  Discharge due to:_  [x]  Other:  progress as tolerated     Terrie Cruz, PT 11/22/2022  11:38 AM    Future Appointments   Date Time Provider Justen Harris   11/29/2022 12:15 PM Maranda Mclaughlin, PT [facility]   12/1/2022 12:15 PM Gini Malone PTA [facility]   12/6/2022 11:30 AM Allison Gonzales, PTA [facility]   12/8/2022 12:15 PM Gini Malone, PTA [facility]

## 2022-11-29 ENCOUNTER — HOSPITAL ENCOUNTER (OUTPATIENT)
Dept: PHYSICAL THERAPY | Age: 27
Discharge: HOME | End: 2022-11-29
Payer: MEDICAID

## 2022-11-29 PROCEDURE — 97530 THERAPEUTIC ACTIVITIES: CPT

## 2022-11-29 PROCEDURE — 97112 NEUROMUSCULAR REEDUCATION: CPT

## 2022-11-29 PROCEDURE — 97110 THERAPEUTIC EXERCISES: CPT

## 2022-11-29 NOTE — PROGRESS NOTES
PT DAILY TREATMENT NOTE     Patient Name: Karyna Wilson  Date:2022  : 1995  [x]  Patient  Verified  Payor: Aubrie Peng / Plan: [employer] [place] / Product Type: Managed Care Medicaid /    In time: 12:15  Out time: 1:06     Total Treatment Time (min): 51     Visit #: 7 of 10    Medicare/BCBS Only   Total Timed Codes (min):  x 1:1 Treatment Time:  x       Treatment Area: Pain in left shoulder [M25.512]  Right shoulder pain [M25.511]    SUBJECTIVE  Pain Level (0-10 scale): 2/10 RIGHT    Any medication changes, allergies to medications, adverse drug reactions, diagnosis change, or new procedure performed?: [x] No    [] Yes (see summary sheet for update)  Subjective functional status/changes:   [] No changes reported  The patient reports he has been getting sharp pains in his right shoulder, not sure why. He reports continued good compliance with HEP.        OBJECTIVE    Modality rationale: decrease edema, decrease inflammation, decrease pain, and increase tissue extensibility to improve the patients ability to perform OH activities    Min Type Additional Details    [] Estim:  []Unatt       []IFC  []Premod                        []Other:  []w/ice   []w/heat  Position:  Location:    [] Estim: []Att    []TENS instruct  []NMES                    []Other:  []w/US   []w/ice   []w/heat  Position:  Location:    []  Traction: [] Cervical       []Lumbar                       [] Prone          []Supine                       []Intermittent   []Continuous Lbs:  [] before manual  [] after manual    []  Ultrasound: []Continuous   [] Pulsed                           []1MHz   []3MHz W/cm2:  Location:    []  Iontophoresis with dexamethasone         Location: [] Take home patch   [] In clinic   PD [x]  Ice     []  heat  []  Ice massage  []  Laser   []  Anodyne Position: seated  Location: B/L shoulders    []  Laser with stim  []  Other:  Position:  Location:    []  Vasopneumatic Device  Pre-treatment girth: Post-treatment girth:   Measured at landmark location:  Pressure:       [] lo [] med [] hi   Temperature: [] lo [] med [] hi   [] Skin assessment post-treatment:  []intact []redness- no adverse reaction    []redness - adverse reaction:   Vasopnuematic compression justification:  Per bilateral girth measures taken and listed above the edema is considered significant and having an impact on the patient's strength, balance, and gait        21    min Therapeutic Exercise:    HEP development and review   UBE fwd  Plank at plinth - 22 inch height   Wall push ups  Supine chest press - 5# bar -TC  TB ER walkout   TB IR    Rationale: increase ROM, increase strength, improve coordination, improve balance, and increase proprioception to improve the patients ability to progress to functional activities limited by pain or other dysfunction     12    min Therapeutic Activity:    TB rows/ext   Full cans x 3-TC  Wall V's with YTB   Rationale: to improve functional activities, model real life movements and performance specific to the patients need with supervision to return the patient to their PLOF      18    min Neuromuscular Re-education:    S/L serratus press   Wall clocks   Scap stabs with YTB 1-4-TC  1/2 prone letters: M, T   Rationale: exercises designed to improve and/or maintain balance, coordination, kinesthetic sense, posture, and/or proprioception for functional activities to improve the patients ability to function in daily life    x min Gait Training:  x feet with x  over level surfaces with xA. Cuing for x.   x feet x of following-  [] March            [] Lateral                   [] Horizontal HT  [] Tandem         [] Banded Lateral     [] Vertical HT  [] Retrograde    [] Banded Monster   [] Dual Task  [] Hurdles          [] Natalie Clunes                    [] Ladder Drills  [] Heel Walk      [] Toe Walk   Rationale: improve safety and dynamic movement with household/community ambulation.     x min Manual Therapy:  NC per insurance restrictions    Rationale: decrease pain, increase ROM, increase tissue extensibility, decrease trigger points, and increase postural awareness to improve his tolerance for OH activities   The manual therapy interventions were performed at a separate and distinct time from the therapeutic activities interventions          X throughout treatment min Patient Education: [x] Review HEP          Other Objective/Functional Measures:    Updated HEP    Progressed planks to lower plinth height   Introduced side lying serratus press     Pain Level (0-10 scale) post treatment: 3/10       ASSESSMENT/Changes in Function:   Updated HEP to reflect progress made in clinic thus far. Progressed planks to lower plinth height for improved challenge to increase shoulder stability. Reports of increased muscle fatigue with 1/2 prone letters, however no pain increase reported throughout. Increased challenge with S/L serratus press, good form maintained throughout. Plan to reassess nv for continuation of therapy services. Patient will continue to benefit from skilled PT services to modify and progress therapeutic interventions, address functional mobility deficits, address ROM deficits, address strength deficits, analyze and address soft tissue restrictions, analyze and cue movement patterns, analyze and modify body mechanics/ergonomics, assess and modify postural abnormalities, address imbalance/dizziness, and instruct in home and community integration to attain remaining goals. [x]  See Plan of Care  []  See progress note/recertification  []  See Discharge Summary         Progress towards goals / Updated goals:  Short term goals to be accomplished in 4 visits: The pt will be IND and compliant with HEP and self management of symptoms. Reports compliance (11/8/2022), updated today 11/18/2022, good compliance and updated today 11/29/2022    Long term goals to be accomplished in 10 visits:    The patient will improve B/L shoulder flexion AROM to 150 deg and abduction to 130 deg to improve his OH activity tolerance. Current: MET Right shoulder AROM: flexion 165 deg, abduction 140 deg without UT compensation and Left shoulder AROM: flexion 150 deg, abduction 135 deg without UT compensation 11/22/2022  The patient will improve B/L C/S side bending AROM 45 deg to improve his ADL tolerance. The patient will report pain at worst to be <6/10 as an indicator of improved QoL. Pain at worst increases to 8/10 (11/10/2022)  The patient will improve FOTO score to 70/100 as a functional indicator of improved mobility.       PLAN  [x]  Upgrade activities as tolerated     [x]  Continue plan of care  []  Update interventions per flow sheet       []  Discharge due to:_  [x]  Other:  plan to reassess nv for continuation of therapy services     Herman Melendez, PT 11/29/2022  11:38 AM    Future Appointments   Date Time Provider Justen Harris   12/1/2022 12:15 PM Junaid Sanchez PTA [facility]   12/6/2022 11:30 AM Florence Cota PTA [facility]   12/8/2022 12:15 PM Junaid Sanchez PTA [facility]

## 2022-12-01 ENCOUNTER — APPOINTMENT (OUTPATIENT)
Dept: PHYSICAL THERAPY | Age: 27
End: 2022-12-01
Payer: MEDICAID

## 2022-12-06 ENCOUNTER — HOSPITAL ENCOUNTER (OUTPATIENT)
Dept: PHYSICAL THERAPY | Age: 27
Discharge: HOME | End: 2022-12-06
Payer: MEDICAID

## 2022-12-06 PROCEDURE — 97110 THERAPEUTIC EXERCISES: CPT

## 2022-12-06 PROCEDURE — 97112 NEUROMUSCULAR REEDUCATION: CPT

## 2022-12-06 PROCEDURE — 97530 THERAPEUTIC ACTIVITIES: CPT

## 2022-12-06 NOTE — PROGRESS NOTES
PT DAILY TREATMENT NOTE     Patient Name: Zygmunt Litten  Date:2022  : 1995  [x]  Patient  Verified  Payor: Torito Sickle / Plan: Guille [employer] / Product Type: Managed Care Medicaid /    In time: 11:32  Out time: 12:13      Total Treatment Time (min): 41      Visit #: 8 of 10    Medicare/BCBS Only   Total Timed Codes (min):  x 1:1 Treatment Time:  x       Treatment Area: Pain in left shoulder [M25.512]  Right shoulder pain [M25.511]    SUBJECTIVE  Pain Level (0-10 scale): 4/10 left, 3/10 right    Any medication changes, allergies to medications, adverse drug reactions, diagnosis change, or new procedure performed?: [x] No    [] Yes (see summary sheet for update)  Subjective functional status/changes:   [] No changes reported  The patient reports over the weekend he had to change a tire and felt a lot of pain in his shoulders and upper back afterwards from straining.     % improved: 30%  Functional gains: improved strength, improved mobility, improved pain levels with OH activities, improved tolerance to reaching behind his back, no longer getting numbness or tingling to left hand, improving tolerance to holding leash, good compliance with HEP   Functional limitations: pain at worst 7/10, occasional pain with sleeping, difficulty and pain with donning/doffing sweatshirts and jackets, pain and difficulty with prolonged shoulder activities, pain and difficulty with heavier activities, decreased lifting tolerance  FOTO: 57/100 (+2 points)      OBJECTIVE    Modality rationale: decrease edema, decrease inflammation, decrease pain, and increase tissue extensibility to improve the patients ability to perform OH activities    Min Type Additional Details    [] Estim:  []Unatt       []IFC  []Premod                        []Other:  []w/ice   []w/heat  Position:  Location:    [] Estim: []Att    []TENS instruct  []NMES                    []Other:  []w/US   []w/ice   []w/heat  Position:  Location: []  Traction: [] Cervical       []Lumbar                       [] Prone          []Supine                       []Intermittent   []Continuous Lbs:  [] before manual  [] after manual    []  Ultrasound: []Continuous   [] Pulsed                           []1MHz   []3MHz W/cm2:  Location:    []  Iontophoresis with dexamethasone         Location: [] Take home patch   [] In clinic   PD [x]  Ice     []  heat  []  Ice massage  []  Laser   []  Anodyne Position: seated  Location: B/L shoulders    []  Laser with stim  []  Other:  Position:  Location:    []  Vasopneumatic Device  Pre-treatment girth:   Post-treatment girth:   Measured at landmark location:  Pressure:       [] lo [] med [] hi   Temperature: [] lo [] med [] hi   [] Skin assessment post-treatment:  []intact []redness- no adverse reaction    []redness - adverse reaction:   Vasopnuematic compression justification:  Per bilateral girth measures taken and listed above the edema is considered significant and having an impact on the patient's strength, balance, and gait        18     min Therapeutic Exercise:    REASSESSMENT, FOTO, HEP REVIEW  UBE fwd    TC:   Plank at plinth - 22 inch height   Wall push ups  Supine chest press - 5# bar -TC  TB ER walkout   TB IR    Rationale: increase ROM, increase strength, improve coordination, improve balance, and increase proprioception to improve the patients ability to progress to functional activities limited by pain or other dysfunction     10     min Therapeutic Activity:    TB rows/ext   Full cans x 3-TC  Wall V's with YTB   Rationale: to improve functional activities, model real life movements and performance specific to the patients need with supervision to return the patient to their PLOF      13     min Neuromuscular Re-education:    S/L serratus press -TC  Wall clocks -TC  Scap stabs with YTB 1-4 STANDING   1/2 prone letters:  M   Rationale: exercises designed to improve and/or maintain balance, coordination, kinesthetic sense, posture, and/or proprioception for functional activities to improve the patients ability to function in daily life    x min Gait Training:  x feet with x  over level surfaces with xA. Cuing for x.   x feet x of following-  [] March            [] Lateral                   [] Horizontal HT  [] Tandem         [] Banded Lateral     [] Vertical HT  [] Retrograde    [] Banded Monster   [] Dual Task  [] Hurdles          [] [place] Score                    [] Ladder Drills  [] Heel Walk      [] Toe Walk   Rationale: improve safety and dynamic movement with household/community ambulation. x min Manual Therapy:  NC per insurance restrictions    Rationale: decrease pain, increase ROM, increase tissue extensibility, decrease trigger points, and increase postural awareness to improve his tolerance for OH activities   The manual therapy interventions were performed at a separate and distinct time from the therapeutic activities interventions          X throughout treatment min Patient Education: [x] Review HEP          Other Objective/Functional Measures:    Right shoulder strength: flexion 5/5, abduction 4+/5, ER at neutral 4-/5, IR at neutral 5/5   Left shoulder strength: flexion 4+/5, abduction 4/5, ER at neutral 4-/5, IR at neutral 5/5   C/S side bending AROM: B/L 40 deg      Pain Level (0-10 scale) post treatment: 3/10        ASSESSMENT/Changes in Function:   The patient presents for reassessment following 8 visits to address B/L shoulder pain from hx of multiple dislocations. He presents with improved C/S side bending AROM B/L compared to IE. The patient was able to tolerate strength testing today, demonstrates decreased strength L>R indicating continued limitation in prolonged shoulder activities and heavier activities. No significant change noted in functional mobility indicated by FOTO score increase of <5 points to indicate minimal clinically important improvement.  Reviewed HEP for continued progression towards IND management of symptoms. The patient will benefit from continued skilled PT to address ongoing limitations and improve QoL. Patient will continue to benefit from skilled PT services to modify and progress therapeutic interventions, address functional mobility deficits, address ROM deficits, address strength deficits, analyze and address soft tissue restrictions, analyze and cue movement patterns, analyze and modify body mechanics/ergonomics, assess and modify postural abnormalities, address imbalance/dizziness, and instruct in home and community integration to attain remaining goals. []  See Plan of Care  [x]  See progress note/recertification  []  See Discharge Summary         Progress towards goals / Updated goals:  Short term goals to be accomplished in 4 visits: The pt will be IND and compliant with HEP and self management of symptoms. Current: Reports compliance (11/8/2022), updated today 11/18/2022, good compliance and updated today 11/29/2022, good compliance 12/06/2022    Long term goals to be accomplished in 10 visits: The patient will improve B/L shoulder flexion AROM to 150 deg and abduction to 130 deg to improve his OH activity tolerance. Current: MET Right shoulder AROM: flexion 165 deg, abduction 140 deg without UT compensation and Left shoulder AROM: flexion 150 deg, abduction 135 deg without UT compensation 11/22/2022  The patient will improve B/L C/S side bending AROM 45 deg to improve his ADL tolerance. Current: progressing 40 deg B/L 12/06/2022  The patient will report pain at worst to be <6/10 as an indicator of improved QoL. Current: Pain at worst increases to 8/10 (11/10/2022), progressing 7/10 at worst 12/06/2022  The patient will improve FOTO score to 70/100 as a functional indicator of improved mobility.     Current: minimal change, 57/100 12/06/2022    PLAN  [x]  Upgrade activities as tolerated     [x]  Continue plan of care  []  Update interventions per flow sheet       []  Discharge due to:_  [x]  Other:  The patient will benefit from continued skilled PT 2x/week for 8 visits       Giovanni Pineda PT 12/6/2022  11:38 AM    Future Appointments   Date Time Provider Justen Harris   12/8/2022 12:15 PM Donna Romano PTA [facility]   12/13/2022 11:30 AM Zion Larson PTA [facility]   12/15/2022 11:30 AM Gretta Ruth, PT [facility]   12/20/2022 11:30 AM Donna Romano PTA [facility]   12/22/2022 11:30 AM Donna Romano PTA [facility]   12/27/2022 11:30 AM Zion Larson PTA [facility]   12/29/2022 11:30 AM Donna Romano PTA [facility]

## 2022-12-06 NOTE — PROGRESS NOTES
[address] [facility] AT [facility]  [address] [facility] [address] [address],[address], Mauricio chavis, Mirna Gutierrez  Phone [phone]  Fax [phone]  PROGRESS NOTE  Patient Name: Irma Hoff : 1995   Treatment/Medical Diagnosis: Pain in left shoulder [M25.512]  Right shoulder pain [M25.511]   Referral Source: Amedeo Bence, MD     Date of Initial Visit: 2022 Attended Visits: 8 Missed Visits: 1     SUMMARY OF TREATMENT  The pt has been seen for 8 visits to address B/L shoulder pain. Therapeutic interventions have included therapeutic exercise, therapeutic activity, neuromuscular re-education, manual treatment, modalities and patient education. CURRENT STATUS  Subjective functional status/changes:      The patient reports over the weekend he had to change a tire and felt a lot of pain in his shoulders and upper back afterwards from straining.      % improved: 30%  Functional gains: improved strength, improved mobility, improved pain levels with OH activities, improved tolerance to reaching behind his back, no longer getting numbness or tingling to left hand, improving tolerance to holding leash, good compliance with HEP   Functional limitations: pain at worst 7/10, occasional pain with sleeping, difficulty and pain with donning/doffing sweatshirts and jackets, pain and difficulty with prolonged shoulder activities, pain and difficulty with heavier activities, decreased lifting tolerance  FOTO: 57/100 (+2 points)    Other Objective/Functional Measures:        Right shoulder strength: flexion 5/5, abduction 4+/5, ER at neutral 4-/5, IR at neutral 5/5   Left shoulder strength: flexion 4+/5, abduction 4/5, ER at neutral 4-/5, IR at neutral 5/5   C/S side bending AROM: B/L 40 deg       Previous objective measures from IE on 2022:  C/S AROM:   Flexion: WNL  Extension: WNL   Side bending: RIGHT 35 deg, LEFT 30 deg   Rotation: WNL      Shoulder AROM:   Flexion: ~130 deg B/L with pain at end range and upper trap compensation on the left   Abduction: ~100 deg B/L with upper trap compensation on the left      ASSESSMENT/Changes in Function:   The patient presents for reassessment following 8 visits to address B/L shoulder pain from hx of multiple dislocations. He presents with improved C/S side bending AROM B/L compared to IE. The patient was able to tolerate strength testing today, demonstrates decreased strength L>R indicating continued limitation in prolonged shoulder activities and heavier activities. No significant change noted in functional mobility indicated by FOTO score increase of <5 points to indicate minimal clinically important improvement. Reviewed HEP for continued progression towards IND management of symptoms. The patient will benefit from continued skilled PT to address ongoing limitations and improve QoL. Progress towards therapy goals:   Short term goals to be accomplished in 4 visits: The pt will be IND and compliant with HEP and self management of symptoms. Current: Reports compliance (11/8/2022), updated today 11/18/2022, good compliance and updated today 11/29/2022, good compliance 12/06/2022     Long term goals to be accomplished in 10 visits: The patient will improve B/L shoulder flexion AROM to 150 deg and abduction to 130 deg to improve his OH activity tolerance. Current: MET Right shoulder AROM: flexion 165 deg, abduction 140 deg without UT compensation and Left shoulder AROM: flexion 150 deg, abduction 135 deg without UT compensation 11/22/2022  The patient will improve B/L C/S side bending AROM 45 deg to improve his ADL tolerance. Current: progressing 40 deg B/L 12/06/2022  The patient will report pain at worst to be <6/10 as an indicator of improved QoL. Current: Pain at worst increases to 8/10 (11/10/2022), progressing 7/10 at worst 12/06/2022  The patient will improve FOTO score to 70/100 as a functional indicator of improved mobility. Current: minimal change, 57/100 12/06/2022    New Goals to be achieved in __8__  treatments: The patient will improve B/L shoulder ABD and ER strength to 5/5 for improved lifting and ADL tolerance. Status at last assessment:  right: abduction 4+/5, ER at neutral 4-/5, left: abduction 4/5, ER at neutral 4-/5  Current:    The patient will improve left shoulder flexion strength to 5/5 for improved OH activity tolerance. Status at last assessment:  4+/5   Current:    The patient will improve B/L C/S side bending AROM to 45 deg to improve his ADL tolerance. Status at last assessment:  40 deg B/L    Current:    The patient will report pain at worst to be <6/10 as an indicator of improved QoL. Status at last assessment:  7/10 at worst    Current:    The patient will improve FOTO score to 70/100 as a functional indicator of improved mobility. Status at last assessment:  57/100    Current:        RECOMMENDATIONS  The patient will benefit from skilled PT 2x/week for 8 visits     If you have any questions/comments please contact us directly at [phone]. Thank you for allowing us to assist in the care of your patient. Therapist Signature: Nelsy Chapman PT Date: 12/6/2022     Time: 12:03 PM   NOTE TO PHYSICIAN:  PLEASE COMPLETE THE ORDERS BELOW AND FAX TO   [facility]: [phone]. If you are unable to process this request in 24 hours please contact our office: [phone].    ___ I have read the above report and request that my patient continue as recommended.   ___ I have read the above report and request that my patient continue therapy with the following changes/special instructions:_________________________________________________________   ___ I have read the above report and request that my patient be discharged from therapy.      Physician Signature:        Date:       Time:    Allison Sarabia MD

## 2022-12-08 ENCOUNTER — HOSPITAL ENCOUNTER (OUTPATIENT)
Dept: PHYSICAL THERAPY | Age: 27
Discharge: HOME | End: 2022-12-08
Payer: MEDICAID

## 2022-12-08 PROCEDURE — 97112 NEUROMUSCULAR REEDUCATION: CPT

## 2022-12-08 PROCEDURE — 97530 THERAPEUTIC ACTIVITIES: CPT

## 2022-12-08 PROCEDURE — 97110 THERAPEUTIC EXERCISES: CPT

## 2022-12-08 NOTE — PROGRESS NOTES
PT DAILY TREATMENT NOTE     Patient Name: Anthony Cornejo  Date:2022  : 1995  [x]  Patient  Verified  Payor: Rosa Lutz / Plan: Carrillo Ramesh / Product Type: Managed Care Medicaid /    In time: 9  Out time: 1:15    Total Treatment Time (min): 60    Visit #: 1 of 8    Medicare/BCBS Only   Total Timed Codes (min):  x 1:1 Treatment Time:  x       Treatment Area: Pain in left shoulder [M25.512]  Right shoulder pain [M25.511]    SUBJECTIVE  Pain Level (0-10 scale): 1/10 left, 1.5/10 right    Any medication changes, allergies to medications, adverse drug reactions, diagnosis change, or new procedure performed?: [x] No    [] Yes (see summary sheet for update)  Subjective functional status/changes:   [] No changes reported  Patient reports he's had decreased pain the past few days, notes it feels better since previous session.     OBJECTIVE    Modality rationale: decrease edema, decrease inflammation, decrease pain, and increase tissue extensibility to improve the patients ability to perform OH activities    Min Type Additional Details    [] Estim:  []Unatt       []IFC  []Premod                        []Other:  []w/ice   []w/heat  Position:  Location:    [] Estim: []Att    []TENS instruct  []NMES                    []Other:  []w/US   []w/ice   []w/heat  Position:  Location:    []  Traction: [] Cervical       []Lumbar                       [] Prone          []Supine                       []Intermittent   []Continuous Lbs:  [] before manual  [] after manual    []  Ultrasound: []Continuous   [] Pulsed                           []1MHz   []3MHz W/cm2:  Location:    []  Iontophoresis with dexamethasone         Location: [] Take home patch   [] In clinic   10' [x]  Ice     []  heat  []  Ice massage  []  Laser   []  Anodyne Position: seated  Location: B/L shoulders    []  Laser with stim  []  Other:  Position:  Location:    []  Vasopneumatic Device  Pre-treatment girth:   Post-treatment girth: Measured at landmark location:  Pressure:       [] lo [] med [] hi   Temperature: [] lo [] med [] hi   [] Skin assessment post-treatment:  []intact []redness- no adverse reaction    []redness - adverse reaction:   Vasopnuematic compression justification:  Per bilateral girth measures taken and listed above the edema is considered significant and having an impact on the patient's strength, balance, and gait        20     min Therapeutic Exercise:    UBE fwd  TB ER walkout   High plank with shoulder taps   TC:     Wall push ups  Supine chest press - 5# bar -TC    TB IR    Rationale: increase ROM, increase strength, improve coordination, improve balance, and increase proprioception to improve the patients ability to progress to functional activities limited by pain or other dysfunction     15    min Therapeutic Activity:    TB rows/ext   Full cans x 3  Wall V's with OTB   Rationale: to improve functional activities, model real life movements and performance specific to the patients need with supervision to return the patient to their PLOF      20     min Neuromuscular Re-education:    S/L serratus press -TC  Wall clocks   Scap stabs with YTB 1-4 STANDING   1/2 prone letters: M   Rationale: exercises designed to improve and/or maintain balance, coordination, kinesthetic sense, posture, and/or proprioception for functional activities to improve the patients ability to function in daily life    x min Gait Training:  x feet with x  over level surfaces with xA. Cuing for x.   x feet x of following-  [] March            [] Lateral                   [] Horizontal HT  [] Tandem         [] Banded Lateral     [] Vertical HT  [] Retrograde    [] Banded Monster   [] Dual Task  [] Hurdles          [] Amy Crock                    [] Ladder Drills  [] Heel Walk      [] Toe Walk   Rationale: improve safety and dynamic movement with household/community ambulation.     x min Manual Therapy:  NC per insurance restrictions    Rationale: decrease pain, increase ROM, increase tissue extensibility, decrease trigger points, and increase postural awareness to improve his tolerance for OH activities   The manual therapy interventions were performed at a separate and distinct time from the therapeutic activities interventions          X throughout treatment min Patient Education: [x] Review HEP          Other Objective/Functional Measures:    Increased resistance with wall clocks and wall V's  Introduced high plank shoulder taps, repeated shelf reach with box and unilateral overhead shelf reach with 1#    Pain Level (0-10 scale) post treatment: 1/10        ASSESSMENT/Changes in Function:   Patient tolerated the addition of functional lifting exercises well with no complaints of any increase in symptoms at the end of session. Patient notes continued difficulty during wall clocks with endurance however no pain. Continue to progress patient with functional activities as tolerated. Patient will continue to benefit from skilled PT services to modify and progress therapeutic interventions, address functional mobility deficits, address ROM deficits, address strength deficits, analyze and address soft tissue restrictions, analyze and cue movement patterns, analyze and modify body mechanics/ergonomics, assess and modify postural abnormalities, address imbalance/dizziness, and instruct in home and community integration to attain remaining goals. [x]  See Plan of Care  []  See progress note/recertification  []  See Discharge Summary         Progress towards goals / Updated goals: The patient will improve B/L shoulder ABD and ER strength to 5/5 for improved lifting and ADL tolerance. Status at last assessment:  right: abduction 4+/5, ER at neutral 4-/5, left: abduction 4/5, ER at neutral 4-/5  Current:    The patient will improve left shoulder flexion strength to 5/5 for improved OH activity tolerance.    Status at last assessment:  4+/5   Current: The patient will improve B/L C/S side bending AROM to 45 deg to improve his ADL tolerance. Status at last assessment:  40 deg B/L    Current:    The patient will report pain at worst to be <6/10 as an indicator of improved QoL. Status at last assessment:  7/10 at worst    Current:    The patient will improve FOTO score to 70/100 as a functional indicator of improved mobility.    Status at last assessment:  57/100    Current:    PLAN  [x]  Upgrade activities as tolerated     [x]  Continue plan of care  []  Update interventions per flow sheet       []  Discharge due to:_  []  Other:      Giselle Obando [facility] 12/8/2022  11:38 AM    Future Appointments   Date Time Provider Justen Harris   12/13/2022 11:30 AM Juan Hui PTA [facility]   12/15/2022 11:30 AM Delilah Brooks PT [facility]   12/20/2022 11:30 AM Loanne Maryland, PTA [facility]   12/22/2022 11:30 AM Loanne Maryland, PTA [facility]   12/27/2022 11:30 AM Baldemar Carrillo PT [facility]   12/29/2022 11:30 AM Baldemar Carrillo PT [facility]

## 2022-12-13 ENCOUNTER — HOSPITAL ENCOUNTER (OUTPATIENT)
Dept: PHYSICAL THERAPY | Age: 27
Discharge: HOME | End: 2022-12-13
Payer: MEDICAID

## 2022-12-13 PROCEDURE — 97110 THERAPEUTIC EXERCISES: CPT

## 2022-12-13 PROCEDURE — 97112 NEUROMUSCULAR REEDUCATION: CPT

## 2022-12-13 PROCEDURE — 97530 THERAPEUTIC ACTIVITIES: CPT

## 2022-12-13 NOTE — PROGRESS NOTES
PT DAILY TREATMENT NOTE     Patient Name: Rad Thompson  Date:2022  : 1995  [x]  Patient  Verified  Payor: Damien Figueredo / Plan: [place] / Product Type: Managed Care Medicaid /    In time: 11:30  Out time: 12:20    Total Treatment Time (min): 50    Visit #: 2 of 8    Medicare/BCBS Only   Total Timed Codes (min):  x 1:1 Treatment Time:  x       Treatment Area: Pain in left shoulder [M25.512]  Right shoulder pain [M25.511]    SUBJECTIVE  Pain Level (0-10 scale): 2/10 left, 1/10 right    Any medication changes, allergies to medications, adverse drug reactions, diagnosis change, or new procedure performed?: [x] No    [] Yes (see summary sheet for update)  Subjective functional status/changes:   [] No changes reported  The patient reports he was not sore after last visit, feels like he could progress more today.       OBJECTIVE    Modality rationale: decrease edema, decrease inflammation, decrease pain, and increase tissue extensibility to improve the patients ability to perform OH activities    Min Type Additional Details    [] Estim:  []Unatt       []IFC  []Premod                        []Other:  []w/ice   []w/heat  Position:  Location:    [] Estim: []Att    []TENS instruct  []NMES                    []Other:  []w/US   []w/ice   []w/heat  Position:  Location:    []  Traction: [] Cervical       []Lumbar                       [] Prone          []Supine                       []Intermittent   []Continuous Lbs:  [] before manual  [] after manual    []  Ultrasound: []Continuous   [] Pulsed                           []1MHz   []3MHz W/cm2:  Location:    []  Iontophoresis with dexamethasone         Location: [] Take home patch   [] In clinic   PD [x]  Ice     []  heat  []  Ice massage  []  Laser   []  Anodyne Position: seated  Location: B/L shoulders    []  Laser with stim  []  Other:  Position:  Location:    []  Vasopneumatic Device  Pre-treatment girth:   Post-treatment girth: Measured at landmark location:  Pressure:       [] lo [] med [] hi   Temperature: [] lo [] med [] hi   [] Skin assessment post-treatment:  []intact []redness- no adverse reaction    []redness - adverse reaction:   Vasopnuematic compression justification:  Per bilateral girth measures taken and listed above the edema is considered significant and having an impact on the patient's strength, balance, and gait        20      min Therapeutic Exercise:    HEP DEVELOPMENT AND REVIEW   UBE fwd  90/90 TB ER/IR   High plinth shoulder taps with serratus anterior activation   Plinth push ups    TC:   Supine chest press - 5# bar -TC  TB IR    Rationale: increase ROM, increase strength, improve coordination, improve balance, and increase proprioception to improve the patients ability to progress to functional activities limited by pain or other dysfunction     20     min Therapeutic Activity:    TB rows/ext   Full cans x 3  Wall V's with OTB  OH press   Rationale: to improve functional activities, model real life movements and performance specific to the patients need with supervision to return the patient to their PLOF      10      min Neuromuscular Re-education:    1/2 prone letters: M, T   1/2 prone rows    TC:   S/L serratus press -TC  Wall clocks   Scap stabs with YTB 1-4 STANDING    Rationale: exercises designed to improve and/or maintain balance, coordination, kinesthetic sense, posture, and/or proprioception for functional activities to improve the patients ability to function in daily life    x min Gait Training:  x feet with x  over level surfaces with xA.  Cuing for x.   x feet x of following-  [] March            [] Lateral                   [] Horizontal HT  [] Tandem         [] Banded Lateral     [] Vertical HT  [] Retrograde    [] Banded Monster   [] Dual Task  [] Hurdles          [] Rin Nakayama                    [] Ladder Drills  [] Heel Walk      [] Toe Walk   Rationale: improve safety and dynamic movement with household/community ambulation. x min Manual Therapy:  NC per insurance restrictions    Rationale: decrease pain, increase ROM, increase tissue extensibility, decrease trigger points, and increase postural awareness to improve his tolerance for OH activities   The manual therapy interventions were performed at a separate and distinct time from the therapeutic activities interventions          X throughout treatment min Patient Education: [x] Review HEP          Other Objective/Functional Measures:    B/L ER and IR tested at 90/90: 4+/5  Introduced 90/90 TB ER/IR, OH press, 1/2 prone row   Added serratus anterior activation with high plinth shoulder taps      Pain Level (0-10 scale) post treatment: 1 /10        ASSESSMENT/Changes in Function:   The patient presents with improvements in B/L ER and IR strength today, was able to tolerate testing at 90/90 positioning. Progressed TB ER/IR to 90/90 positioning for improved lifting tolerance and stability, occasional verbal and tactile cuing required for positioning. No UT compensation noted with wall V today, indicating improving low trap strength. Added serratus anterior activation with high plinth shoulder taps for improved lifting stability. Provided patient with OTB for home to progress resistance for wall clocks and added thread the needles for improved mobility. Continue to progress as tolerated. Patient will continue to benefit from skilled PT services to modify and progress therapeutic interventions, address functional mobility deficits, address ROM deficits, address strength deficits, analyze and address soft tissue restrictions, analyze and cue movement patterns, analyze and modify body mechanics/ergonomics, assess and modify postural abnormalities, address imbalance/dizziness, and instruct in home and community integration to attain remaining goals.         [x]  See Plan of Care  []  See progress note/recertification  []  See Discharge Summary Progress towards goals / Updated goals:  New Goals to be achieved in __8__  treatments: The patient will improve B/L shoulder ABD and ER strength to 5/5 for improved lifting and ADL tolerance. Status at last assessment:  right: abduction 4+/5, ER at neutral 4-/5, left: abduction 4/5, ER at neutral 4-/5  Current:  PROGRESSING B/L ER and IR tested at 90/90: 4+/5 12/13/2022  The patient will improve left shoulder flexion strength to 5/5 for improved OH activity tolerance. Status at last assessment:  4+/5   Current:    The patient will improve B/L C/S side bending AROM to 45 deg to improve his ADL tolerance. Status at last assessment:  40 deg B/L    Current:    The patient will report pain at worst to be <6/10 as an indicator of improved QoL. Status at last assessment:  7/10 at worst    Current:    The patient will improve FOTO score to 70/100 as a functional indicator of improved mobility.    Status at last assessment:  57/100    Current:      PLAN  [x]  Upgrade activities as tolerated     [x]  Continue plan of care  []  Update interventions per flow sheet       []  Discharge due to:_  []  Other:      Tom Lennon, PT 12/13/2022  11:38 AM    Future Appointments   Date Time Provider Justen Harris   12/15/2022 11:30 AM [address] [facility]   12/20/2022 11:30 AM Marietta Sierra PTA [facility]   12/22/2022 11:30 AM Marietta Sierra PTA [facility]   12/27/2022 11:30 AM Niko Garcia PT [facility]   12/29/2022 11:30 AM Niko Garcia PT [facility]

## 2022-12-15 ENCOUNTER — HOSPITAL ENCOUNTER (OUTPATIENT)
Dept: PHYSICAL THERAPY | Age: 27
Discharge: HOME | End: 2022-12-15
Payer: MEDICAID

## 2022-12-15 PROCEDURE — 97110 THERAPEUTIC EXERCISES: CPT

## 2022-12-15 PROCEDURE — 97530 THERAPEUTIC ACTIVITIES: CPT

## 2022-12-15 PROCEDURE — 97112 NEUROMUSCULAR REEDUCATION: CPT

## 2022-12-15 NOTE — PROGRESS NOTES
PT DAILY TREATMENT NOTE     Patient Name: Anthony Aparicio  Date:12/15/2022  : 1995  [x]  Patient  Verified  Payor: Prudence Antonio / Plan: Ari Reynolds / Product Type: Managed Care Medicaid /    In time: 11:31 Out time: 12:43   Total Treatment Time (min): 72   Visit #: 3 of 8      Treatment Area: Pain in left shoulder [M25.512]  Right shoulder pain [M25.511]    SUBJECTIVE  Pain Level (0-10 scale): 1/10 bilaterally  Any medication changes, allergies to medications, adverse drug reactions, diagnosis change, or new procedure performed?: [x] No    [] Yes (see summary sheet for update)  Subjective functional status/changes:   [] No changes reported  Pt reports muscle soreness after last visit but no increase in pain.     OBJECTIVE:    Modality rationale: decrease edema, decrease inflammation, decrease pain, and increase tissue extensibility to improve the patients ability to perform OH activities    Min Type Additional Details    [] Estim:  []Unatt       []IFC  []Premod                        []Other:  []w/ice   []w/heat  Position:  Location:    [] Estim: []Att    []TENS instruct  []NMES                    []Other:  []w/US   []w/ice   []w/heat  Position:  Location:    []  Traction: [] Cervical       []Lumbar                       [] Prone          []Supine                       []Intermittent   []Continuous Lbs:  [] before manual  [] after manual    []  Ultrasound: []Continuous   [] Pulsed                           []1MHz   []3MHz W/cm2:  Location:    []  Iontophoresis with dexamethasone         Location: [] Take home patch   [] In clinic   10 [x]  Ice     []  heat  []  Ice massage  []  Laser   []  Anodyne Position: seated  Location: R shoulders    []  Laser with stim  []  Other:  Position:  Location:    []  Vasopneumatic Device  Pre-treatment girth:   Post-treatment girth:   Measured at landmark location:  Pressure:       [] lo [] med [] hi   Temperature: [] lo [] med [] hi   [] Skin assessment post-treatment:  []intact []redness- no adverse reaction    []redness - adverse reaction:   Vasopnuematic compression justification:  Per bilateral girth measures taken and listed above the edema is considered significant and having an impact on the patient's strength, balance, and gait      32      min Therapeutic Exercise:    HEP DEVELOPMENT AND REVIEW   UBE fwd  90/90 TB ER/IR   High plinth shoulder taps with serratus anterior activation   Plinth push ups with +    TC:   Supine chest press - 5# bar -TC  TB IR    Rationale: increase ROM, increase strength, improve coordination, improve balance, and increase proprioception to improve the patients ability to progress to functional activities limited by pain or other dysfunction     15     min Therapeutic Activity:    TB rows/ext   Full cans x 3  Wall V's with OTB  OH press   Rationale: to improve functional activities, model real life movements and performance specific to the patients need with supervision to return the patient to their PLOF      15     min Neuromuscular Re-education:    1/2 prone letters: M, T   1/2 prone rows    TC:   S/L serratus press -TC  Wall clocks   Scap stabs with YTB 1-4 STANDING    Rationale: exercises designed to improve and/or maintain balance, coordination, kinesthetic sense, posture, and/or proprioception for functional activities to improve the patients ability to function in daily life    x min Gait Training:  x feet with x  over level surfaces with xA. Cuing for x.   x feet x of following-  [] March            [] Lateral                   [] Horizontal HT  [] Tandem         [] Banded Lateral     [] Vertical HT  [] Retrograde    [] Banded Monster   [] Dual Task  [] Hurdles          [] Janee Troup                    [] Ladder Drills  [] Heel Walk      [] Toe Walk   Rationale: improve safety and dynamic movement with household/community ambulation.     x min Manual Therapy:  NC per insurance restrictions    Rationale: decrease pain, increase ROM, increase tissue extensibility, decrease trigger points, and increase postural awareness to improve his tolerance for OH activities   The manual therapy interventions were performed at a separate and distinct time from the therapeutic activities interventions          X throughout treatment min Patient Education: [x] Review HEP          Other Objective/Functional Measures:    Increased resistance to OTB for scap stabs 1-4   X10 PNF D2 of L UE    Pain Level (0-10 scale) post treatment:  0 /10        ASSESSMENT/Changes in Function:   Pt reports responding to last sessions progressed resistance, reporting muscle soreness after last session. Pt reports decreased worst pain level at 5-6/10 over the past week. Pt able to complete scap stabs with increase resistance TB level, though showing muscle fatigue in L UE. Pt requires Vcs for increasing LEONOR during standing IR/ER. No demonstration of compensation during B rows and ext. Patient will continue to benefit from skilled PT services to modify and progress therapeutic interventions, address functional mobility deficits, address ROM deficits, address strength deficits, analyze and address soft tissue restrictions, analyze and cue movement patterns, analyze and modify body mechanics/ergonomics, assess and modify postural abnormalities, address imbalance/dizziness, and instruct in home and community integration to attain remaining goals. [x]  See Plan of Care  []  See progress note/recertification  []  See Discharge Summary         Progress towards goals / Updated goals:  New Goals to be achieved in __8__  treatments: The patient will improve B/L shoulder ABD and ER strength to 5/5 for improved lifting and ADL tolerance.    Status at last assessment:  right: abduction 4+/5, ER at neutral 4-/5, left: abduction 4/5, ER at neutral 4-/5  Current:  PROGRESSING B/L ER and IR tested at 90/90: 4+/5 12/13/2022  The patient will improve left shoulder flexion strength to 5/5 for improved OH activity tolerance. Status at last assessment:  4+/5   Current:    The patient will improve B/L C/S side bending AROM to 45 deg to improve his ADL tolerance. Status at last assessment:  40 deg B/L    Current:    The patient will report pain at worst to be <6/10 as an indicator of improved QoL. Status at last assessment:  7/10 at worst    Current: (12/15/22) goal progressing, 5-6/10 at worst over the past week  The patient will improve FOTO score to 70/100 as a functional indicator of improved mobility.    Status at last assessment:  57/100    Current:      PLAN  [x]  Upgrade activities as tolerated     [x]  Continue plan of care  []  Update interventions per flow sheet       []  Discharge due to:_  []  Other:      Carlene Lares PTA 12/15/2022  11:38 AM    Future Appointments   Date Time Provider Justen Harris   12/15/2022 11:30 AM [address] [facility]   12/20/2022 11:30 AM Chon Gutierrez PTA [facility]   12/22/2022 11:30 AM Chon Gutierrez PTA [facility]   12/27/2022 11:30 AM Rivera Dillard PT [facility]   12/29/2022 11:30 AM Rivera Dillard PT [facility]

## 2022-12-20 ENCOUNTER — HOSPITAL ENCOUNTER (OUTPATIENT)
Dept: PHYSICAL THERAPY | Age: 27
Discharge: HOME | End: 2022-12-20
Payer: MEDICAID

## 2022-12-20 PROCEDURE — 97112 NEUROMUSCULAR REEDUCATION: CPT

## 2022-12-20 PROCEDURE — 97110 THERAPEUTIC EXERCISES: CPT

## 2022-12-20 PROCEDURE — 97530 THERAPEUTIC ACTIVITIES: CPT

## 2022-12-20 NOTE — PROGRESS NOTES
PT DAILY TREATMENT NOTE     Patient Name: Rui Prior  Date:2022  : 1995  [x]  Patient  Verified  Payor: Jayda Camejo / Plan: [place] / Product Type: Managed Care Medicaid /    In time:  Out time:   Total Treatment Time (min): 62  Visit #: 4 of 8      Treatment Area: Pain in left shoulder [M25.512]  Right shoulder pain [M25.511]    SUBJECTIVE  Pain Level (0-10 scale): 1/10 bilaterally  Any medication changes, allergies to medications, adverse drug reactions, diagnosis change, or new procedure performed?: [x] No    [] Yes (see summary sheet for update)  Subjective functional status/changes:   [] No changes reported  Pt notes his pain has improved, notes he felt slightly unstable with walking his dog this weekend.   OBJECTIVE:    Modality rationale: decrease edema, decrease inflammation, decrease pain, and increase tissue extensibility to improve the patients ability to perform OH activities    Min Type Additional Details    [] Estim:  []Unatt       []IFC  []Premod                        []Other:  []w/ice   []w/heat  Position:  Location:    [] Estim: []Att    []TENS instruct  []NMES                    []Other:  []w/US   []w/ice   []w/heat  Position:  Location:    []  Traction: [] Cervical       []Lumbar                       [] Prone          []Supine                       []Intermittent   []Continuous Lbs:  [] before manual  [] after manual    []  Ultrasound: []Continuous   [] Pulsed                           []1MHz   []3MHz W/cm2:  Location:    []  Iontophoresis with dexamethasone         Location: [] Take home patch   [] In clinic   10 [x]  Ice     []  heat  []  Ice massage  []  Laser   []  Anodyne Position: seated  Location: R shoulders    []  Laser with stim  []  Other:  Position:  Location:    []  Vasopneumatic Device  Pre-treatment girth:   Post-treatment girth:   Measured at landmark location:  Pressure:       [] lo [] med [] hi   Temperature: [] lo [] med [] hi   [] Skin assessment post-treatment:  []intact []redness- no adverse reaction    []redness - adverse reaction:   Vasopnuematic compression justification:  Per bilateral girth measures taken and listed above the edema is considered significant and having an impact on the patient's strength, balance, and gait      22      min Therapeutic Exercise:    UBE fwd  90/90 TB ER/IR   High plinth shoulder taps with serratus anterior activation   Plinth push ups with +    TC:   Supine chest press - 5# bar -TC  TB IR    Rationale: increase ROM, increase strength, improve coordination, improve balance, and increase proprioception to improve the patients ability to progress to functional activities limited by pain or other dysfunction     15     min Therapeutic Activity:    TB rows/ext   Full cans x 3  Wall V's with OTB  OH press   Rationale: to improve functional activities, model real life movements and performance specific to the patients need with supervision to return the patient to their PLOF      15     min Neuromuscular Re-education:    1/2 prone letters: M, T   1/2 prone rows    TC:   S/L serratus press -TC  Wall clocks   Scap stabs with YTB 1-4 STANDING    Rationale: exercises designed to improve and/or maintain balance, coordination, kinesthetic sense, posture, and/or proprioception for functional activities to improve the patients ability to function in daily life    x min Gait Training:  x feet with x  over level surfaces with xA. Cuing for x.   x feet x of following-  [] March            [] Lateral                   [] Horizontal HT  [] Tandem         [] Banded Lateral     [] Vertical HT  [] Retrograde    [] Banded Monster   [] Dual Task  [] Hurdles          [] Solmon [place]                    [] Ladder Drills  [] Heel Walk      [] Toe Walk   Rationale: improve safety and dynamic movement with household/community ambulation.     x min Manual Therapy:  NC per insurance restrictions    Rationale: decrease pain, increase ROM, increase tissue extensibility, decrease trigger points, and increase postural awareness to improve his tolerance for OH activities   The manual therapy interventions were performed at a separate and distinct time from the therapeutic activities interventions          X throughout treatment min Patient Education: [x] Review HEP          Other Objective/Functional Measures:    Increased repetitions with 90/90 IR/ER, scap stabs  Introduced OH press  L shoulder flexion: 5/5 (GOAL CHECK)    Pain Level (0-10 scale) post treatment:  0 /10        ASSESSMENT/Changes in Function:   Patient notes he feels slight \"pulling\" sensation in the anterior shoulder during rows/ext's and OH press. Patient notes he does feel increased ease with OH shelf reach. Verbal cuing for posture and scapular retraction during 1/2 prone rows. Continue to progress as tolerated. Patient will continue to benefit from skilled PT services to modify and progress therapeutic interventions, address functional mobility deficits, address ROM deficits, address strength deficits, analyze and address soft tissue restrictions, analyze and cue movement patterns, analyze and modify body mechanics/ergonomics, assess and modify postural abnormalities, address imbalance/dizziness, and instruct in home and community integration to attain remaining goals. [x]  See Plan of Care  []  See progress note/recertification  []  See Discharge Summary         Progress towards goals / Updated goals:  New Goals to be achieved in __8__  treatments: The patient will improve B/L shoulder ABD and ER strength to 5/5 for improved lifting and ADL tolerance. Status at last assessment:  right: abduction 4+/5, ER at neutral 4-/5, left: abduction 4/5, ER at neutral 4-/5  Current:  PROGRESSING B/L ER and IR tested at 90/90: 4+/5 12/13/2022  The patient will improve left shoulder flexion strength to 5/5 for improved OH activity tolerance.    Status at last assessment:  4+/5 Current:  MET 5/5 (12/20/2022)  The patient will improve B/L C/S side bending AROM to 45 deg to improve his ADL tolerance. Status at last assessment:  40 deg B/L    Current:    The patient will report pain at worst to be <6/10 as an indicator of improved QoL. Status at last assessment:  7/10 at worst    Current: (12/15/22) goal progressing, 5-6/10 at worst over the past week  The patient will improve FOTO score to 70/100 as a functional indicator of improved mobility.    Status at last assessment:  57/100    Current:      PLAN  [x]  Upgrade activities as tolerated     [x]  Continue plan of care  []  Update interventions per flow sheet       []  Discharge due to:_  []  Other:      Patrick Skaggs PTA 12/20/2022  11:38 AM    Future Appointments   Date Time Provider Justen Harris   12/22/2022 11:30 AM [place] PTA [facility]   12/27/2022 11:30 AM Casey Grajeda PT [facility]   12/29/2022 11:30 AM Casey Grajeda PT [facility]

## 2022-12-22 ENCOUNTER — HOSPITAL ENCOUNTER (OUTPATIENT)
Dept: PHYSICAL THERAPY | Age: 27
Discharge: HOME | End: 2022-12-22
Payer: MEDICAID

## 2022-12-22 PROCEDURE — 97530 THERAPEUTIC ACTIVITIES: CPT

## 2022-12-22 PROCEDURE — 97110 THERAPEUTIC EXERCISES: CPT

## 2022-12-22 PROCEDURE — 97112 NEUROMUSCULAR REEDUCATION: CPT

## 2022-12-22 NOTE — PROGRESS NOTES
Left message for patient to call us to set up appointment.    PT DAILY TREATMENT NOTE     Patient Name: Rad Thompson  Date:2022  : 1995  [x]  Patient  Verified  Payor: Damien Figueredo / Plan: Jag Melendrez / Product Type: Managed Care Medicaid /    In time: 11:35 Out time: 12:42  Total Treatment Time (min): 79'  Visit #: 5 of 8      Treatment Area: Pain in left shoulder [M25.512]  Right shoulder pain [M25.511]    SUBJECTIVE  Pain Level (0-10 scale): 1/10 bilaterally  Any medication changes, allergies to medications, adverse drug reactions, diagnosis change, or new procedure performed?: [x] No    [] Yes (see summary sheet for update)  Subjective functional status/changes:   [] No changes reported  Pt has no c/o pain in thoracic spine during today's visit    OBJECTIVE:    Modality rationale: decrease edema, decrease inflammation, decrease pain, and increase tissue extensibility to improve the patients ability to perform OH activities    Min Type Additional Details    [] Estim:  []Unatt       []IFC  []Premod                        []Other:  []w/ice   []w/heat  Position:  Location:    [] Estim: []Att    []TENS instruct  []NMES                    []Other:  []w/US   []w/ice   []w/heat  Position:  Location:    []  Traction: [] Cervical       []Lumbar                       [] Prone          []Supine                       []Intermittent   []Continuous Lbs:  [] before manual  [] after manual    []  Ultrasound: []Continuous   [] Pulsed                           []1MHz   []3MHz W/cm2:  Location:    []  Iontophoresis with dexamethasone         Location: [] Take home patch   [] In clinic   10 [x]  Ice     []  heat  []  Ice massage  []  Laser   []  Anodyne Position: seated  Location: R shoulders    []  Laser with stim  []  Other:  Position:  Location:    []  Vasopneumatic Device  Pre-treatment girth:   Post-treatment girth:   Measured at landmark location:  Pressure:       [] lo [] med [] hi   Temperature: [] lo [] med [] hi   [x] Skin assessment post-treatment:  [x]intact []redness- no adverse reaction    []redness - adverse reaction:   Vasopnuematic compression justification:  Per bilateral girth measures taken and listed above the edema is considered significant and having an impact on the patient's strength, balance, and gait      27     min Therapeutic Exercise:    UBE fwd  90/90 TB ER/IR   High plinth shoulder taps with serratus anterior activation   Plinth push ups with +  Supine chest press - 5# bar    TC:   TB IR    Rationale: increase ROM, increase strength, improve coordination, improve balance, and increase proprioception to improve the patients ability to progress to functional activities limited by pain or other dysfunction     15     min Therapeutic Activity:    TB rows/ext   Full cans x 3  Wall V's with OTB  OH press   Rationale: to improve functional activities, model real life movements and performance specific to the patients need with supervision to return the patient to their PLOF      15     min Neuromuscular Re-education:    1/2 prone letters: M, T   1/2 prone rows    TC:   S/L serratus press -TC  Wall clocks   Scap stabs with YTB 1-4 STANDING    Rationale: exercises designed to improve and/or maintain balance, coordination, kinesthetic sense, posture, and/or proprioception for functional activities to improve the patients ability to function in daily life    x min Gait Training:  x feet with x  over level surfaces with xA. Cuing for x.   x feet x of following-  [] March            [] Lateral                   [] Horizontal HT  [] Tandem         [] Banded Lateral     [] Vertical HT  [] Retrograde    [] Banded Monster   [] Dual Task  [] Hurdles          [] Shabbir Dings                    [] Ladder Drills  [] Heel Walk      [] Toe Walk   Rationale: improve safety and dynamic movement with household/community ambulation.     x min Manual Therapy:  NC per insurance restrictions    Rationale: decrease pain, increase ROM, increase tissue extensibility, decrease trigger points, and increase postural awareness to improve his tolerance for OH activities   The manual therapy interventions were performed at a separate and distinct time from the therapeutic activities interventions          X throughout treatment min Patient Education: [x] Review HEP          Other Objective/Functional Measures:    Lat pull down, 40#  Chest press, 10#    Pain Level (0-10 scale) post treatment:  3 /10 (R shoulder) 4/10 (L shoulder)   \"soreness\" bilateral shoulder    ASSESSMENT/Changes in Function:   Pt has no c/o pain with today's increase in resistance, does have appropriate fatigue response, reports muscle soreness after session. Test goals at NV and continue to progress as tolerable. Patient will continue to benefit from skilled PT services to modify and progress therapeutic interventions, address functional mobility deficits, address ROM deficits, address strength deficits, analyze and address soft tissue restrictions, analyze and cue movement patterns, analyze and modify body mechanics/ergonomics, assess and modify postural abnormalities, address imbalance/dizziness, and instruct in home and community integration to attain remaining goals. [x]  See Plan of Care  []  See progress note/recertification  []  See Discharge Summary         Progress towards goals / Updated goals:  New Goals to be achieved in __8__  treatments: The patient will improve B/L shoulder ABD and ER strength to 5/5 for improved lifting and ADL tolerance. Status at last assessment:  right: abduction 4+/5, ER at neutral 4-/5, left: abduction 4/5, ER at neutral 4-/5  Current:  PROGRESSING B/L ER and IR tested at 90/90: 4+/5 12/13/2022  The patient will improve left shoulder flexion strength to 5/5 for improved OH activity tolerance. Status at last assessment:  4+/5   Current:  MET 5/5 (12/20/2022)  The patient will improve B/L C/S side bending AROM to 45 deg to improve his ADL tolerance. Status at last assessment:  40 deg B/L    Current:    The patient will report pain at worst to be <6/10 as an indicator of improved QoL. Status at last assessment:  7/10 at worst    Current: (12/15/22) goal progressing, 5-6/10 at worst over the past week  The patient will improve FOTO score to 70/100 as a functional indicator of improved mobility.    Status at last assessment:  57/100    Current:      PLAN  [x]  Upgrade activities as tolerated     [x]  Continue plan of care  []  Update interventions per flow sheet       []  Discharge due to:_  []  Other:      Jaswant Chowdary, MATT 12/22/2022  11:38 AM    Future Appointments   Date Time Provider Justen Harris   12/27/2022 11:30 AM David Rhodes PT [facility]   12/29/2022 11:30 AM David Rhodes PT [facility]

## 2022-12-27 ENCOUNTER — HOSPITAL ENCOUNTER (OUTPATIENT)
Dept: PHYSICAL THERAPY | Age: 27
Discharge: HOME | End: 2022-12-27
Payer: MEDICAID

## 2022-12-27 PROCEDURE — 97110 THERAPEUTIC EXERCISES: CPT | Performed by: PHYSICAL THERAPIST

## 2022-12-27 PROCEDURE — 97530 THERAPEUTIC ACTIVITIES: CPT | Performed by: PHYSICAL THERAPIST

## 2022-12-27 PROCEDURE — 97112 NEUROMUSCULAR REEDUCATION: CPT | Performed by: PHYSICAL THERAPIST

## 2022-12-27 NOTE — PROGRESS NOTES
PT DAILY TREATMENT NOTE     Patient Name: Stephen Marin  Date:2022  : 1995  [x]  Patient  Verified  Payor: Ladan Arnold / Plan: Alfreda Seller / Product Type: Managed Care Medicaid /    In time: [id number] Out time: 1239pm  Total Treatment Time (min): 62min  Visit #: 6 of 8      Treatment Area: Pain in left shoulder [M25.512]  Right shoulder pain [M25.511]    SUBJECTIVE  Pain Level (0-10 scale): 1 on R, 0/10 bilaterally  Any medication changes, allergies to medications, adverse drug reactions, diagnosis change, or new procedure performed?: [x] No    [] Yes (see summary sheet for update)  Subjective functional status/changes:   [] No changes reported  Pt reports he is a little stiff from laying on it wrong.     OBJECTIVE:    Modality rationale: decrease edema, decrease inflammation, decrease pain, and increase tissue extensibility to improve the patients ability to perform OH activities    Min Type Additional Details    [] Estim:  []Unatt       []IFC  []Premod                        []Other:  []w/ice   []w/heat  Position:  Location:    [] Estim: []Att    []TENS instruct  []NMES                    []Other:  []w/US   []w/ice   []w/heat  Position:  Location:    []  Traction: [] Cervical       []Lumbar                       [] Prone          []Supine                       []Intermittent   []Continuous Lbs:  [] before manual  [] after manual    []  Ultrasound: []Continuous   [] Pulsed                           []1MHz   []3MHz W/cm2:  Location:    []  Iontophoresis with dexamethasone         Location: [] Take home patch   [] In clinic   10 [x]  Ice     []  heat  []  Ice massage  []  Laser   []  Anodyne Position: seated  Location: R shoulders    []  Laser with stim  []  Other:  Position:  Location:    []  Vasopneumatic Device  Pre-treatment girth:   Post-treatment girth:   Measured at landmark location:  Pressure:       [] lo [] med [] hi   Temperature: [] lo [] med [] hi   [x] Skin assessment post-treatment:  [x]intact []redness- no adverse reaction    []redness - adverse reaction:   Vasopnuematic compression justification:  Per bilateral girth measures taken and listed above the edema is considered significant and having an impact on the patient's strength, balance, and gait      22    min Therapeutic Exercise:    UBE fwd  90/90 TB ER/IR   High plinth shoulder taps with serratus anterior activation   Plinth push ups with +  Supine chest press - 5# bar    TC:   TB IR    Rationale: increase ROM, increase strength, improve coordination, improve balance, and increase proprioception to improve the patients ability to progress to functional activities limited by pain or other dysfunction     15     min Therapeutic Activity:    TB rows/ext   Full cans x 3  Wall V's with OTB  OH press  Wheel barrel walk outs   Rationale: to improve functional activities, model real life movements and performance specific to the patients need with supervision to return the patient to their PLOF      15     min Neuromuscular Re-education:    1/2 prone letters: M, T   1/2 prone rows  S/L serratus press -at CC 20#  Wall clocks: TC  Scap stabs with YTB 1-4 STANDING    Rationale: exercises designed to improve and/or maintain balance, coordination, kinesthetic sense, posture, and/or proprioception for functional activities to improve the patients ability to function in daily life    x min Gait Training:  x feet with x  over level surfaces with xA. Cuing for x.   x feet x of following-  [] March            [] Lateral                   [] Horizontal HT  [] Tandem         [] Banded Lateral     [] Vertical HT  [] Retrograde    [] Banded Monster   [] Dual Task  [] Hurdles          [] Manny Servando                    [] Ladder Drills  [] Heel Walk      [] Toe Walk   Rationale: improve safety and dynamic movement with household/community ambulation.     x min Manual Therapy:  NC per insurance restrictions    Rationale: decrease pain, increase ROM, increase tissue extensibility, decrease trigger points, and increase postural awareness to improve his tolerance for OH activities   The manual therapy interventions were performed at a separate and distinct time from the therapeutic activities interventions          X throughout treatment min Patient Education: [x] Review HEP          Other Objective/Functional Measures:    R/L shoulder   ER: 5/4+   12/27/22  C/s SB: R/L: 35/47deg  Added SA press at CC 20#   Added wheel barrel SA walk outs on Red swiss ball. Pain Level (0-10 scale) post treatment:  0/10 (R shoulder) 0/10 (L shoulder)   \"soreness\" bilateral shoulder    ASSESSMENT/Changes in Function:   Pt did well with progression today with mod challenge with new therex. No pain reported. Skilled cues for form with push ups to not extend arms too far to place stress on ant shoulder due to hx of dislocations. Continue to progress SA strengthening. Patient will continue to benefit from skilled PT services to modify and progress therapeutic interventions, address functional mobility deficits, address ROM deficits, address strength deficits, analyze and address soft tissue restrictions, analyze and cue movement patterns, analyze and modify body mechanics/ergonomics, assess and modify postural abnormalities, address imbalance/dizziness, and instruct in home and community integration to attain remaining goals. [x]  See Plan of Care  []  See progress note/recertification  []  See Discharge Summary         Progress towards goals / Updated goals:  New Goals to be achieved in __8__  treatments: The patient will improve B/L shoulder ABD and ER strength to 5/5 for improved lifting and ADL tolerance.    Status at last assessment:  right: abduction 4+/5, ER at neutral 4-/5, left: abduction 4/5, ER at neutral 4-/5  Current:  PROGRESSING B/L ER and IR tested at 90/90: 4+/5 12/13/2022,  R/L shoulder   ER: 5/4+   12/27/22 12/27/22  The patient will improve left shoulder flexion strength to 5/5 for improved OH activity tolerance. Status at last assessment:  4+/5   Current:  MET 5/5 (12/20/2022)  The patient will improve B/L C/S side bending AROM to 45 deg to improve his ADL tolerance. Status at last assessment:  40 deg B/L    Current:    The patient will report pain at worst to be <6/10 as an indicator of improved QoL. Status at last assessment:  7/10 at worst    Current: (12/15/22) goal progressing, 5-6/10 at worst over the past week  The patient will improve FOTO score to 70/100 as a functional indicator of improved mobility.    Status at last assessment:  57/100    Current:      PLAN  [x]  Upgrade activities as tolerated     [x]  Continue plan of care  []  Update interventions per flow sheet       []  Discharge due to:_  []  Other:      Antoinette Hancock, PT 12/27/2022  11:38 AM    Future Appointments   Date Time Provider Justen Hraris   12/27/2022 11:30 AM Karan Kawasaki, PT [facility]   12/29/2022 11:30 AM Karan Kawasaki, PT [facility]

## 2022-12-29 ENCOUNTER — HOSPITAL ENCOUNTER (OUTPATIENT)
Dept: PHYSICAL THERAPY | Age: 27
End: 2022-12-29
Payer: MEDICAID

## 2022-12-29 PROCEDURE — 97110 THERAPEUTIC EXERCISES: CPT | Performed by: PHYSICAL THERAPIST

## 2022-12-29 PROCEDURE — 97530 THERAPEUTIC ACTIVITIES: CPT | Performed by: PHYSICAL THERAPIST

## 2022-12-29 PROCEDURE — 97112 NEUROMUSCULAR REEDUCATION: CPT | Performed by: PHYSICAL THERAPIST

## 2022-12-29 NOTE — PROGRESS NOTES
PT DAILY TREATMENT NOTE     Patient Name: Liliana Benites  Date:2022  : 1995  [x]  Patient  Verified  Payor: Trude Holstein / Plan: Urban Ladder / Product Type: Managed Care Medicaid /    In time: 1130 am Out time: 1240 am  Total Treatment Time (min): 70 min  Visit #: 7 of 8      Treatment Area: Pain in left shoulder [M25.512]  Right shoulder pain [M25.511]    SUBJECTIVE  Pain Level (0-10 scale): 0 on R, 1/10 L   Any medication changes, allergies to medications, adverse drug reactions, diagnosis change, or new procedure performed?: [x] No    [] Yes (see summary sheet for update)  Subjective functional status/changes:   [] No changes reported  Pt reports he was really sore after last visit in upper back.     OBJECTIVE:    Modality rationale: decrease edema, decrease inflammation, decrease pain, and increase tissue extensibility to improve the patients ability to perform OH activities    Min Type Additional Details    [] Estim:  []Unatt       []IFC  []Premod                        []Other:  []w/ice   []w/heat  Position:  Location:    [] Estim: []Att    []TENS instruct  []NMES                    []Other:  []w/US   []w/ice   []w/heat  Position:  Location:    []  Traction: [] Cervical       []Lumbar                       [] Prone          []Supine                       []Intermittent   []Continuous Lbs:  [] before manual  [] after manual    []  Ultrasound: []Continuous   [] Pulsed                           []1MHz   []3MHz W/cm2:  Location:    []  Iontophoresis with dexamethasone         Location: [] Take home patch   [] In clinic   10 [x]  Ice     []  heat  []  Ice massage  []  Laser   []  Anodyne Position: seated  Location: R shoulders    []  Laser with stim  []  Other:  Position:  Location:    []  Vasopneumatic Device  Pre-treatment girth:   Post-treatment girth:   Measured at landmark location:  Pressure:       [] lo [] med [] hi   Temperature: [] lo [] med [] hi   [x] Skin assessment post-treatment:  [x]intact []redness- no adverse reaction    []redness - adverse reaction:   Vasopnuematic compression justification:  Per bilateral girth measures taken and listed above the edema is considered significant and having an impact on the patient's strength, balance, and gait      20    min Therapeutic Exercise:    UBE fwd  90/90 TB ER/IR   High plinth shoulder taps with serratus anterior activation   Plinth push ups with +  Supine chest press - 10# DB       Rationale: increase ROM, increase strength, improve coordination, improve balance, and increase proprioception to improve the patients ability to progress to functional activities limited by pain or other dysfunction     20    min Therapeutic Activity:    TB rows/ext   Wall V's with OTB  OH press  Wheel barrel walk outs   Rationale: to improve functional activities, model real life movements and performance specific to the patients need with supervision to return the patient to their PLOF      20   min Neuromuscular Re-education:    1/2 prone letters: M, T   Y on SB  1/2 prone rows  S/L serratus press -at CC 20#  Wall clocks: TC  Scap stabs with YTB 1-4 STANDING  TC   Rationale: exercises designed to improve and/or maintain balance, coordination, kinesthetic sense, posture, and/or proprioception for functional activities to improve the patients ability to function in daily life    x min Gait Training:  x feet with x  over level surfaces with xA. Cuing for x.   x feet x of following-  [] March            [] Lateral                   [] Horizontal HT  [] Tandem         [] Banded Lateral     [] Vertical HT  [] Retrograde    [] Banded Monster   [] Dual Task  [] Hurdles          [] [employer]                    [] Ladder Drills  [] Heel Walk      [] Toe Walk   Rationale: improve safety and dynamic movement with household/community ambulation.     x min Manual Therapy:  NC per insurance restrictions    Rationale: decrease pain, increase ROM, increase tissue extensibility, decrease trigger points, and increase postural awareness to improve his tolerance for OH activities   The manual therapy interventions were performed at a separate and distinct time from the therapeutic activities interventions          X throughout treatment min Patient Education: [x] Review HEP          Other Objective/Functional Measures:    Progressed to Y's on SB with improved form over prone on plinth  Increased resistance at lat pull down    Pain Level (0-10 scale) post treatment:  0/10 (R shoulder) 0/10 (L shoulder)   \"soreness\" bilateral shoulder    ASSESSMENT/Changes in Function:   Pt reports mod mm soreness post last visit. Mild progression of program today with good tolerance and no c/o pain. Pt did well with SA activation on SB walkouts with improvements noted compared to last session with less TC required. Continue to progress SA strengthening. Patient will continue to benefit from skilled PT services to modify and progress therapeutic interventions, address functional mobility deficits, address ROM deficits, address strength deficits, analyze and address soft tissue restrictions, analyze and cue movement patterns, analyze and modify body mechanics/ergonomics, assess and modify postural abnormalities, address imbalance/dizziness, and instruct in home and community integration to attain remaining goals. [x]  See Plan of Care  []  See progress note/recertification  []  See Discharge Summary         Progress towards goals / Updated goals:  New Goals to be achieved in __8__  treatments: The patient will improve B/L shoulder ABD and ER strength to 5/5 for improved lifting and ADL tolerance.    Status at last assessment:  right: abduction 4+/5, ER at neutral 4-/5, left: abduction 4/5, ER at neutral 4-/5  Current:  PROGRESSING B/L ER and IR tested at 90/90: 4+/5 12/13/2022,  R/L shoulder   ER: 5/4+   12/27/22 12/27/22  The patient will improve left shoulder flexion strength to 5/5 for improved OH activity tolerance. Status at last assessment:  4+/5   Current:  MET 5/5 (12/20/2022)  The patient will improve B/L C/S side bending AROM to 45 deg to improve his ADL tolerance. Status at last assessment:  40 deg B/L    Current:    The patient will report pain at worst to be <6/10 as an indicator of improved QoL. Status at last assessment:  7/10 at worst    Current: (12/15/22) goal progressing, 5-6/10 at worst over the past week  The patient will improve FOTO score to 70/100 as a functional indicator of improved mobility.    Status at last assessment:  57/100    Current:      PLAN  [x]  Upgrade activities as tolerated     [x]  Continue plan of care  []  Update interventions per flow sheet       []  Discharge due to:_  [x]  Other:  PN NV    Clayton Cordial, PT 12/29/2022  11:38 AM    Future Appointments   Date Time Provider Justen [place]   12/29/2022 11:30 AM Mervat Mccallum [place] [id number]   1/5/2023 12:00 PM [place] [id number] [address]   1/10/2023 11:30 AM Otis Garcia [facility] [address]   1/12/2023 12:00 PM Woo Chembonifacio Rochas [facility] 1 [facility] [address]   1/17/2023 12:00 PM Usha Garcia PTA [facility] [address]   1/19/2023 12:00 PM [address] [facility] 1 [facility] [address]   1/24/2023 12:00 PM Mandeep [facility] [address]   1/26/2023 12:00 PM Usha Garcia PTA [place] [id number]

## 2023-01-05 ENCOUNTER — HOSPITAL ENCOUNTER (OUTPATIENT)
Dept: PHYSICAL THERAPY | Age: 28
Discharge: HOME | End: 2023-01-05
Payer: MEDICAID

## 2023-01-05 PROCEDURE — 97110 THERAPEUTIC EXERCISES: CPT

## 2023-01-05 PROCEDURE — 97112 NEUROMUSCULAR REEDUCATION: CPT

## 2023-01-05 PROCEDURE — 97530 THERAPEUTIC ACTIVITIES: CPT

## 2023-01-05 NOTE — PROGRESS NOTES
[address] [facility] AT [facility]  [address] [place] [address],[address], Mirna Harris  Phone [phone]  Fax [phone]  PROGRESS NOTE  Patient Name: Mayte He : 1995   Treatment/Medical Diagnosis: Pain in left shoulder [M25.512]  Right shoulder pain [M25.511]   Referral Source: Curt Alfonso MD     Date of Initial Visit: 2022 Attended Visits: 16 Missed Visits: 1     SUMMARY OF TREATMENT  The pt has been seen for 16 visits to address B/L shoulder pain. Therapeutic interventions have included therapeutic exercise, therapeutic activity, neuromuscular re-education, manual treatment, modalities and patient education. CURRENT STATUS  Subjective functional status/changes:   % improved: 60%  Functional gains: improved strength, some improvements mobility, able to lift mattress easier, less pain in previously vulnerable shoulder positions, pain at worst over the last week 4/10   Functional limitations: left shoulder strength, lacking mobility B/L   FOTO: 63/100 (+8 points)    Other Objective/Functional Measures:        Shoulder strength: 5/5 ER/IR at 90/90  C/S rotation AROM: RIGHT 45 deg, LEFT 40 deg       ASSESSMENT/Changes in Function:   The patient presents for reassessment following 16 visits to address B/L shoulder pain from multiple dislocations. He presents today with full shoulder strength globally and without pain upon resisted testing of shoulder rotation strength in 90/90 positioning. Improved cervical ROM since IE indicating improved tolerance to ADL's and sleeping. He presents with improved functional mobility indicated by FOTO score increase of +8 points. The patient will be placed on a 30 day hold for a trial of IND management of symptoms with updated HEP. The patient will be reassessed in 30 days for formal discharge if appropriate, or continue PT 1-2x/week for 8 visits if continued skilled PT services are required.      Progress towards goals / Updated goals:  New Goals to be achieved in __8__  treatments: The patient will improve B/L shoulder ABD and ER strength to 5/5 for improved lifting and ADL tolerance. Status at last assessment:  right: abduction 4+/5, ER at neutral 4-/5, left: abduction 4/5, ER at neutral 4-/5  Current:  PROGRESSING B/L ER and IR tested at 90/90: 4+/5 12/13/2022,  R/L shoulder   ER: 5/4+ 12/27/22, MET 5/5 ER/IR at 90/90 01/05/2023  The patient will improve left shoulder flexion strength to 5/5 for improved OH activity tolerance. Status at last assessment:  4+/5   Current:  MET 5/5 (12/20/2022)  The patient will improve B/L C/S side bending AROM to 45 deg to improve his ADL tolerance. Status at last assessment:  40 deg B/L    Current:  NEARLY MET RIGHT 45 deg, LEFT 40 deg 01/05/2023  The patient will report pain at worst to be <6/10 as an indicator of improved QoL. Status at last assessment:  7/10 at worst    Current: (12/15/22) goal progressing, 5-6/10 at worst over the past week, MET 4/10 01/05/2023  The patient will improve FOTO score to 70/100 as a functional indicator of improved mobility. Status at last assessment:  57/100    Current:  NEARLY MET 63/100 01/05/2023        New Goals to be achieved in __30__  days: The patient will be IND and compliant with HEP to prepare for D/C. Status at last assessment:  updated program   Current:    The patient will improve left C/S side bending AROM to 45 deg to improve his ADL and sleeping tolerance. Status at last assessment:  40 deg    Current:    The patient will improve FOTO score to 70/100 as a functional indicator of improved mobility. Status at last assessment:  63/100    Current:        RECOMMENDATIONS  The patient will be placed on a 30 day hold for a trial of IND management of symptoms with updated HEP.  The patient will be reassessed in 30 days for formal discharge if appropriate, or continue PT 1-2x/week for 8 visits if continued skilled PT services are required. If you have any questions/comments please contact us directly at [phone]. Thank you for allowing us to assist in the care of your patient. Therapist Signature: Petra Rueda PT Date: 1/5/2023     Time: 1:30 PM   NOTE TO PHYSICIAN:  PLEASE COMPLETE THE ORDERS BELOW AND FAX TO   [facility] [place]: [phone]. If you are unable to process this request in 24 hours please contact our office: [phone].    ___ I have read the above report and request that my patient continue as recommended.   ___ I have read the above report and request that my patient continue therapy with the following changes/special instructions:_________________________________________________________   ___ I have read the above report and request that my patient be discharged from therapy.      Physician Signature:        Date:       Time:    Fani Hercules MD

## 2023-01-05 NOTE — PROGRESS NOTES
PT DAILY TREATMENT NOTE     Patient Name: Ta Dillard  Date:2023  : 1995  [x]  Patient  Verified  Payor: Yesika Webb / Plan: Zbigniew Zuluaga / Product Type: Managed Care Medicaid /    In time: 12:13 am Out time: 1240   Total Treatment Time (min): 27  Visit #: 8 of 8      Treatment Area: Pain in left shoulder [M25.512]  Right shoulder pain [M25.511]    SUBJECTIVE  Pain Level (0-10 scale): 0/10 on R, 1/10 L   Any medication changes, allergies to medications, adverse drug reactions, diagnosis change, or new procedure performed?: [x] No    [] Yes (see summary sheet for update)  Subjective functional status/changes:   [] No changes reported    % improved: 60%  Functional gains: improved strength, some improvements mobility, able to lift mattress easier, less pain in previously vulnerable shoulder positions, pain at worst over the last week 10   Functional limitations: left shoulder strength, lacking mobility B/L   FOTO: 63/100 (+8 points)    OBJECTIVE:    Modality rationale: decrease edema, decrease inflammation, decrease pain, and increase tissue extensibility to improve the patients ability to perform OH activities    Min Type Additional Details    [] Estim:  []Unatt       []IFC  []Premod                        []Other:  []w/ice   []w/heat  Position:  Location:    [] Estim: []Att    []TENS instruct  []NMES                    []Other:  []w/US   []w/ice   []w/heat  Position:  Location:    []  Traction: [] Cervical       []Lumbar                       [] Prone          []Supine                       []Intermittent   []Continuous Lbs:  [] before manual  [] after manual    []  Ultrasound: []Continuous   [] Pulsed                           []1MHz   []3MHz W/cm2:  Location:    []  Iontophoresis with dexamethasone         Location: [] Take home patch   [] In clinic   x [x]  Ice     []  heat  []  Ice massage  []  Laser   []  Anodyne Position: seated  Location: R shoulders    []  Laser with stim  []  Other:  Position:  Location:    []  Vasopneumatic Device  Pre-treatment girth:   Post-treatment girth:   Measured at landmark location:  Pressure:       [] lo [] med [] hi   Temperature: [] lo [] med [] hi   [x] Skin assessment post-treatment:  [x]intact []redness- no adverse reaction    []redness - adverse reaction:   Vasopnuematic compression justification:  Per bilateral girth measures taken and listed above the edema is considered significant and having an impact on the patient's strength, balance, and gait      11     min Therapeutic Exercise:    REASSESSMENT, HEP DEVELOPMENT AND REVIEW       TC:   UBE fwd  90/90 TB ER/IR   High plinth shoulder taps with serratus anterior activation   Plinth push ups with +  Supine chest press - 10# DB       Rationale: increase ROM, increase strength, improve coordination, improve balance, and increase proprioception to improve the patients ability to progress to functional activities limited by pain or other dysfunction     8    min Therapeutic Activity:    FOTO ASSESSMENT AND REVIEW    TC:   TB rows/ext   Wall V's with OTB  OH press  Wheel barrel walk outs   Rationale: to improve functional activities, model real life movements and performance specific to the patients need with supervision to return the patient to their PLOF      8   min Neuromuscular Re-education:    Education on lifting mechanics   Open books for scapular mobility     1/2 prone letters: M, T   Y on SB  1/2 prone rows  S/L serratus press -at CC 20#  Wall clocks: TC  Scap stabs with YTB 1-4 STANDING  TC   Rationale: exercises designed to improve and/or maintain balance, coordination, kinesthetic sense, posture, and/or proprioception for functional activities to improve the patients ability to function in daily life    x min Gait Training:  x feet with x  over level surfaces with xA.  Cuing for x.   x feet x of following-  [] March            [] Lateral                   [] Horizontal HT  [] Tandem [] Banded Lateral     [] Vertical HT  [] Retrograde    [] Banded Monster   [] Dual Task  [] Hurdles          [] Madison Moraima                    [] Ladder Drills  [] Heel Walk      [] Toe Walk   Rationale: improve safety and dynamic movement with household/community ambulation. x min Manual Therapy:  NC per insurance restrictions    Rationale: decrease pain, increase ROM, increase tissue extensibility, decrease trigger points, and increase postural awareness to improve his tolerance for OH activities   The manual therapy interventions were performed at a separate and distinct time from the therapeutic activities interventions          X throughout treatment min Patient Education: [x] Review HEP          Other Objective/Functional Measures:    Shoulder strength: 5/5 ER/IR at 90/90  C/S rotation AROM: RIGHT 45 deg, LEFT 40 deg      ASSESSMENT/Changes in Function:   The patient presents for reassessment following 16 visits to address B/L shoulder pain from multiple dislocations. He presents today with full shoulder strength globally and without pain upon resisted testing of shoulder rotation strength in 90/90 positioning. Improved cervical ROM since IE indicating improved tolerance to ADL's and sleeping. He presents with improved functional mobility indicated by FOTO score increase of +8 points. The patient will be placed on a 30 day hold for a trial of IND management of symptoms with updated HEP. The patient will be reassessed in 30 days for formal discharge if appropriate, or continue PT 1-2x/week for 8 visits if continued skilled PT services are required.      Patient will continue to benefit from skilled PT services to modify and progress therapeutic interventions, address functional mobility deficits, address ROM deficits, address strength deficits, analyze and address soft tissue restrictions, analyze and cue movement patterns, analyze and modify body mechanics/ergonomics, assess and modify postural abnormalities, address imbalance/dizziness, and instruct in home and community integration to attain remaining goals. [x]  See Plan of Care  []  See progress note/recertification  []  See Discharge Summary         Progress towards goals / Updated goals:  New Goals to be achieved in __8__  treatments: The patient will improve B/L shoulder ABD and ER strength to 5/5 for improved lifting and ADL tolerance. Status at last assessment:  right: abduction 4+/5, ER at neutral 4-/5, left: abduction 4/5, ER at neutral 4-/5  Current:  PROGRESSING B/L ER and IR tested at 90/90: 4+/5 12/13/2022,  R/L shoulder   ER: 5/4+ 12/27/22, MET 5/5 ER/IR at 90/90 01/05/2023  The patient will improve left shoulder flexion strength to 5/5 for improved OH activity tolerance. Status at last assessment:  4+/5   Current:  MET 5/5 (12/20/2022)  The patient will improve B/L C/S side bending AROM to 45 deg to improve his ADL tolerance. Status at last assessment:  40 deg B/L    Current:  NEARLY MET RIGHT 45 deg, LEFT 40 deg 01/05/2023  The patient will report pain at worst to be <6/10 as an indicator of improved QoL. Status at last assessment:  7/10 at worst    Current: (12/15/22) goal progressing, 5-6/10 at worst over the past week, MET 4/10 01/05/2023  The patient will improve FOTO score to 70/100 as a functional indicator of improved mobility. Status at last assessment:  57/100    Current:  NEARLY MET 63/100 01/05/2023     PLAN  [x]  Upgrade activities as tolerated     [x]  Continue plan of care  []  Update interventions per flow sheet       []  Discharge due to:_  [x]  Other:  The patient will be placed on a 30 day hold for a trial of IND management of symptoms with updated HEP. The patient will be reassessed in 30 days for formal discharge if appropriate, or continue PT 1-2x/week for 8 visits if continued skilled PT services are required.       Julita Patterson, PT 1/5/2023  11:38 AM    Future Appointments   Date Time Provider Justen Harris   1/10/2023 11:30 AM Micaela Perez, PT [facility]   1/12/2023 12:00 PM [facility] PT [place] [id number] [facility]   1/17/2023 12:00 PM [facility] PT [facility]   1[facility]19/2023 12:00 PM [facility] PT [place] [id number] [facility]   [facility]/24[facility]2023 12:00 [facility]   1/26/2023 12:00 PM Yordan Lomax [facility]

## 2023-01-10 ENCOUNTER — APPOINTMENT (OUTPATIENT)
Dept: PHYSICAL THERAPY | Age: 28
End: 2023-01-10
Payer: MEDICAID

## 2023-01-12 ENCOUNTER — APPOINTMENT (OUTPATIENT)
Dept: PHYSICAL THERAPY | Age: 28
End: 2023-01-12
Payer: MEDICAID

## 2023-01-17 ENCOUNTER — APPOINTMENT (OUTPATIENT)
Dept: PHYSICAL THERAPY | Age: 28
End: 2023-01-17
Payer: MEDICAID

## 2023-01-19 ENCOUNTER — APPOINTMENT (OUTPATIENT)
Dept: PHYSICAL THERAPY | Age: 28
End: 2023-01-19
Payer: MEDICAID

## 2023-01-24 ENCOUNTER — APPOINTMENT (OUTPATIENT)
Dept: PHYSICAL THERAPY | Age: 28
End: 2023-01-24
Payer: MEDICAID

## 2023-01-26 ENCOUNTER — APPOINTMENT (OUTPATIENT)
Dept: PHYSICAL THERAPY | Age: 28
End: 2023-01-26
Payer: MEDICAID